# Patient Record
Sex: FEMALE | Race: WHITE | NOT HISPANIC OR LATINO
[De-identification: names, ages, dates, MRNs, and addresses within clinical notes are randomized per-mention and may not be internally consistent; named-entity substitution may affect disease eponyms.]

---

## 2017-01-04 PROBLEM — C57.00 FALLOPIAN TUBE CANCER, CARCINOMA (HCC): Status: ACTIVE | Noted: 2017-01-04

## 2017-01-04 PROBLEM — E66.01 MORBID OBESITY WITH BMI OF 40.0-44.9, ADULT (HCC): Status: ACTIVE | Noted: 2017-01-04

## 2017-03-15 PROBLEM — Z12.83 ENCOUNTER FOR SCREENING FOR MALIGNANT NEOPLASM OF SKIN: Status: ACTIVE | Noted: 2017-03-15

## 2017-04-24 PROBLEM — J30.2 SEASONAL ALLERGIC RHINITIS: Status: ACTIVE | Noted: 2017-04-24

## 2017-09-19 ENCOUNTER — RX ONLY (OUTPATIENT)
Age: 57
Setting detail: RX ONLY
End: 2017-09-19

## 2018-01-16 PROBLEM — I47.10 PAROXYSMAL SVT (SUPRAVENTRICULAR TACHYCARDIA) (HCC): Status: ACTIVE | Noted: 2018-01-16

## 2018-01-18 ENCOUNTER — OFFICE VISIT (OUTPATIENT)
Dept: CARDIOLOGY | Facility: MEDICAL CENTER | Age: 58
End: 2018-01-18
Payer: COMMERCIAL

## 2018-01-18 VITALS
BODY MASS INDEX: 42.89 KG/M2 | HEART RATE: 86 BPM | SYSTOLIC BLOOD PRESSURE: 98 MMHG | OXYGEN SATURATION: 94 % | HEIGHT: 68 IN | DIASTOLIC BLOOD PRESSURE: 68 MMHG | WEIGHT: 283 LBS

## 2018-01-18 DIAGNOSIS — R00.2 PALPITATIONS: ICD-10-CM

## 2018-01-18 DIAGNOSIS — I47.10 PAROXYSMAL SVT (SUPRAVENTRICULAR TACHYCARDIA): ICD-10-CM

## 2018-01-18 PROCEDURE — 99203 OFFICE O/P NEW LOW 30 MIN: CPT | Performed by: INTERNAL MEDICINE

## 2018-01-18 ASSESSMENT — ENCOUNTER SYMPTOMS
FEVER: 0
BRUISES/BLEEDS EASILY: 0
PND: 0
HEARTBURN: 0
DIZZINESS: 0
BLURRED VISION: 0
COUGH: 0
HEADACHES: 0
DEPRESSION: 0
SHORTNESS OF BREATH: 0
NAUSEA: 0
NERVOUS/ANXIOUS: 0
EYE DISCHARGE: 0
CHILLS: 0
MYALGIAS: 0
PALPITATIONS: 1

## 2018-01-18 NOTE — PROGRESS NOTES
Subjective:   Sydney Carlin is a 57 y.o. female who presents today in consultation at the request of Dr. Valencia for palpitations.  Chief Complaint: My heart races and I have chest pain  This patient has a possible causation of 34 months ago for SVT. It was recommended that she take Toprol-XL 25 mg per day. She is currently not taking the drug and she does not know when she stopped it.  In the last 6 weeks she has had 3-4 episodes at rest of 1-2 minutes of rapid heart action sometimes associated with left parasternal chest pain. She is worried about the implications thereof.  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 She is currently receiving cyclophosphamide, epocadastat and an immunotherapeutic vaccine under investigational protocol.   Overall she feels fairly well. Other problems as documented below including sleep apnea.  Office EKG today demonstrates voltage criteria for LVH, otherwise normal.    Past Medical History:   Diagnosis Date   • Allergy     grapes, surya   • Back pain    • Birth control    • Fatty liver    • H/O: pneumonia    • Hyperplastic colon polyp    • Lactose intolerance    • Obesity    • Osteoarthritis    • Seasonal allergic rhinitis 4/24/2017   • Sleep apnea 11/20/2012   • Thyroid nodule 8/15/2013     Past Surgical History:   Procedure Laterality Date   • ANKLE LIGAMENT RECONSTRUCTION  08/2001    right ankle surgery   • LUMPECTOMY  1995    benign   • LUMPECTOMY      benign   • TONSILLECTOMY  6 years old     Family History   Problem Relation Age of Onset   • Hypertension Mother    • Cancer Father      Lung/Brain   • Arthritis Father    • Diabetes Child      History   Smoking Status   • Never Smoker   Smokeless Tobacco   • Never Used     Allergies   Allergen Reactions   • Asa [Aspirin] Rash   • Ibuprofen Rash   • Sulfa Drugs Swelling     Outpatient Encounter Prescriptions as of 1/18/2018   Medication Sig Dispense Refill   • acetaminophen  (TYLENOL) 160 MG/5ML elixir Take 1,000 mg by mouth.     • pantoprazole (PROTONIX) 40 MG Pack 40 mg by Nasogastric route every day.     • NON SPECIFIED Epacadostat 100 mg, 3 tabs BID     • Insulin Glargine (TOUJEO SOLOSTAR) 300 UNIT/ML Solution Pen-injector Inject 10 Units as instructed every day. 5 PEN 3   • cyclophosphamide (CYTOXAN) 50 MG Tab Take 50 mg by mouth 2 times a day.     • oxycodone-acetaminophen (PERCOCET) 5-325 MG Tab Take 1-2 Tabs by mouth every four hours as needed.     • ONETOUCH DELICA LANCETS 33G Misc USE ONE LANCET TO TEST 6 TIMES A DAY AND AS NEEDED FOR SYMPTOMS OF HIGH OR LOW SUGAR 100 Each 3   • glucose blood (ONE TOUCH ULTRA TEST) strip Use six strips to test daily and prn for sx of high or low sugar, Dx: E11.65 DM,II w/ hyperglycemia,unspecified long term insulin use 100 Strip 3   • Insulin Pen Needle (B-D UF III MINI PEN NEEDLES) 31G X 5 MM Misc Use one daily as directed, Dx: E11.65 DM,II w/ hyperglycemia,unspecified long term insulin use 100 Each 3   • Cholecalciferol (VITAMIN D3) 2000 UNIT Cap Take  by mouth. 10,000 units every other day alternating with 6000 units every other day     • Blood Glucose Monitoring Suppl Device Meter: Dispense Device of Insurance Preference. Sig. Use as directed for blood sugar monitoring. Dx: E11.65 DM,II 1 Device 0   • metoprolol (LOPRESSOR) 25 MG Tab Take 1 Tab by mouth 2 times a day. 60 Tab 2     No facility-administered encounter medications on file as of 1/18/2018.      Review of Systems   Constitutional: Negative for chills, fever and malaise/fatigue.   Eyes: Negative for blurred vision and discharge.   Respiratory: Negative for cough and shortness of breath.    Cardiovascular: Positive for chest pain and palpitations. Negative for leg swelling and PND.   Gastrointestinal: Negative for heartburn and nausea.   Genitourinary: Negative for dysuria and urgency.   Musculoskeletal: Negative for myalgias.   Skin: Negative for itching and rash.   Neurological:  "Negative for dizziness and headaches.   Endo/Heme/Allergies: Negative for environmental allergies. Does not bruise/bleed easily.   Psychiatric/Behavioral: Negative for depression. The patient is not nervous/anxious.         Objective:   BP (!) 98/68   Pulse 86   Ht 1.727 m (5' 8\")   Wt (!) 128.4 kg (283 lb)   SpO2 94%   BMI 43.03 kg/m²     Physical Exam   Constitutional: She is oriented to person, place, and time. She appears well-developed and well-nourished.   HENT:   Head: Normocephalic and atraumatic.   Eyes: Conjunctivae and EOM are normal. No scleral icterus.   Neck: Neck supple. No JVD present. No thyromegaly present.   Cardiovascular: Normal rate, regular rhythm and normal heart sounds.  Exam reveals no gallop and no friction rub.    No murmur heard.  Pulmonary/Chest: Effort normal and breath sounds normal. No respiratory distress. She has no wheezes. She has no rales. She exhibits no tenderness.   Abdominal: Soft. Bowel sounds are normal. She exhibits no distension and no mass. There is no tenderness.   Neurological: She is alert and oriented to person, place, and time. Coordination normal.   Skin: Skin is warm and dry. No rash noted. No pallor.   Psychiatric: She has a normal mood and affect. Her behavior is normal. Judgment and thought content normal.       Assessment:     1. Paroxysmal SVT (supraventricular tachycardia) (CMS-[facility])  EKG   2. Palpitations         Medical Decision Making:  Today's Assessment / Status / Plan:   Statistically it is most likely she is having short episodes of PSVT.  I suggested getting a cell phone Winnie to record her short episodes of palpitations.  I also agree with resuming metoprolol SR 25 mg at bedtime  She'll check with her physicians at [place] regarding compatibility with their drugs.  I reassured her that her current chief complaint is not life-threatening.  If her oncologist needs further evaluation such as echocardiography or outpatient monitoring we'll be glad " to do that.  Thank you for this consultation  She will see Dr. Otilio Vazquez my partner regarding cardio oncology follow-up.

## 2018-01-18 NOTE — LETTER
[facility]   [address]   [address]  [place] [address]  Phone: [phone]  Fax: [phone]              Sydney Carlin  1960    Encounter Date: 1/18/2018    Marques Gallagher M.D.          PROGRESS NOTE:  Subjective:   Sydney Carlin is a 57 y.o. female who presents today in consultation at the request of Dr. Valencia for palpitations.  Chief Complaint: My heart races and I have chest pain  This patient has a possible causation of 34 months ago for SVT. It was recommended that she take Toprol-XL 25 mg per day. She is currently not taking the drug and she does not know when she stopped it.  In the last 6 weeks she has had 3-4 episodes at rest of 1-2 minutes of rapid heart action sometimes associated with left parasternal chest pain. She is worried about the implications thereof.  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 She is currently receiving cyclophosphamide, epocadastat and an immunotherapeutic vaccine under investigational protocol.   Overall she feels fairly well. Other problems as documented below including sleep apnea.  Office EKG today demonstrates voltage criteria for LVH, otherwise normal.    Past Medical History:   Diagnosis Date   • Allergy     grapes, surya   • Back pain    • Birth control    • Fatty liver    • H/O: pneumonia    • Hyperplastic colon polyp    • Lactose intolerance    • Obesity    • Osteoarthritis    • Seasonal allergic rhinitis 4/24/2017   • Sleep apnea 11/20/2012   • Thyroid nodule 8/15/2013     Past Surgical History:   Procedure Laterality Date   • ANKLE LIGAMENT RECONSTRUCTION  08/2001    right ankle surgery   • LUMPECTOMY  1995    benign   • LUMPECTOMY      benign   • TONSILLECTOMY  6 years old     Family History   Problem Relation Age of Onset   • Hypertension Mother    • Cancer Father      Lung/Brain   • Arthritis Father    • Diabetes Child       History   Smoking Status   • Never Smoker   Smokeless Tobacco   • Never Used     Allergies   Allergen Reactions   • Asa [Aspirin] Rash   • Ibuprofen Rash   • Sulfa Drugs Swelling     Outpatient Encounter Prescriptions as of 1/18/2018   Medication Sig Dispense Refill   • acetaminophen (TYLENOL) 160 MG/5ML elixir Take 1,000 mg by mouth.     • pantoprazole (PROTONIX) 40 MG Pack 40 mg by Nasogastric route every day.     • NON SPECIFIED Epacadostat 100 mg, 3 tabs BID     • Insulin Glargine (TOUJEO SOLOSTAR) 300 UNIT/ML Solution Pen-injector Inject 10 Units as instructed every day. 5 PEN 3   • cyclophosphamide (CYTOXAN) 50 MG Tab Take 50 mg by mouth 2 times a day.     • oxycodone-acetaminophen (PERCOCET) 5-325 MG Tab Take 1-2 Tabs by mouth every four hours as needed.     • ONETOUCH DELICA LANCETS 33G Misc USE ONE LANCET TO TEST 6 TIMES A DAY AND AS NEEDED FOR SYMPTOMS OF HIGH OR LOW SUGAR 100 Each 3   • glucose blood (ONE TOUCH ULTRA TEST) strip Use six strips to test daily and prn for sx of high or low sugar, Dx: E11.65 DM,II w/ hyperglycemia,unspecified long term insulin use 100 Strip 3   • Insulin Pen Needle (B-D UF III MINI PEN NEEDLES) 31G X 5 MM Misc Use one daily as directed, Dx: E11.65 DM,II w/ hyperglycemia,unspecified long term insulin use 100 Each 3   • Cholecalciferol (VITAMIN D3) 2000 UNIT Cap Take  by mouth. 10,000 units every other day alternating with 6000 units every other day     • Blood Glucose Monitoring Suppl Device Meter: Dispense Device of Insurance Preference. Sig. Use as directed for blood sugar monitoring. Dx: E11.65 DM,II 1 Device 0   • metoprolol (LOPRESSOR) 25 MG Tab Take 1 Tab by mouth 2 times a day. 60 Tab 2     No facility-administered encounter medications on file as of 1/18/2018.      Review of Systems   Constitutional: Negative for chills, fever and malaise/fatigue.   Eyes: Negative for blurred vision and discharge.   Respiratory: Negative for cough and shortness of breath.     "  Cardiovascular: Positive for chest pain and palpitations. Negative for leg swelling and PND.   Gastrointestinal: Negative for heartburn and nausea.   Genitourinary: Negative for dysuria and urgency.   Musculoskeletal: Negative for myalgias.   Skin: Negative for itching and rash.   Neurological: Negative for dizziness and headaches.   Endo/Heme/Allergies: Negative for environmental allergies. Does not bruise/bleed easily.   Psychiatric/Behavioral: Negative for depression. The patient is not nervous/anxious.         Objective:   BP (!) 98/68   Pulse 86   Ht 1.727 m (5' 8\")   Wt (!) 128.4 kg (283 lb)   SpO2 94%   BMI 43.03 kg/m²      Physical Exam   Constitutional: She is oriented to person, place, and time. She appears well-developed and well-nourished.   HENT:   Head: Normocephalic and atraumatic.   Eyes: Conjunctivae and EOM are normal. No scleral icterus.   Neck: Neck supple. No JVD present. No thyromegaly present.   Cardiovascular: Normal rate, regular rhythm and normal heart sounds.  Exam reveals no gallop and no friction rub.    No murmur heard.  Pulmonary/Chest: Effort normal and breath sounds normal. No respiratory distress. She has no wheezes. She has no rales. She exhibits no tenderness.   Abdominal: Soft. Bowel sounds are normal. She exhibits no distension and no mass. There is no tenderness.   Neurological: She is alert and oriented to person, place, and time. Coordination normal.   Skin: Skin is warm and dry. No rash noted. No pallor.   Psychiatric: She has a normal mood and affect. Her behavior is normal. Judgment and thought content normal.       Assessment:     1. Paroxysmal SVT (supraventricular tachycardia) (CMS-[facility])  EKG   2. Palpitations         Medical Decision Making:  Today's Assessment / Status / Plan:   Statistically it is most likely she is having short episodes of PSVT.  I suggested getting a cell phone Winnie to record her short episodes of palpitations.  I also agree with resuming " metoprolol SR 25 mg at bedtime  She'll check with her physicians at [place] regarding compatibility with their drugs.  I reassured her that her current chief complaint is not life-threatening.  If her oncologist needs further evaluation such as echocardiography or outpatient monitoring we'll be glad to do that.  Thank you for this consultation  She will see Dr. Otilio Vazquez my partner regarding cardio oncology follow-up.      KARISSA LopesO.  [address]  [place] [address]  VIA In Basket

## 2018-02-26 ENCOUNTER — OFFICE VISIT (OUTPATIENT)
Dept: CARDIOLOGY | Facility: MEDICAL CENTER | Age: 58
End: 2018-02-26
Payer: COMMERCIAL

## 2018-02-26 VITALS
WEIGHT: 286 LBS | HEIGHT: 68 IN | OXYGEN SATURATION: 94 % | BODY MASS INDEX: 43.35 KG/M2 | DIASTOLIC BLOOD PRESSURE: 80 MMHG | SYSTOLIC BLOOD PRESSURE: 130 MMHG | HEART RATE: 92 BPM

## 2018-02-26 DIAGNOSIS — C57.00 CARCINOMA OF FALLOPIAN TUBE, UNSPECIFIED LATERALITY (HCC): ICD-10-CM

## 2018-02-26 DIAGNOSIS — G47.33 OSA (OBSTRUCTIVE SLEEP APNEA): ICD-10-CM

## 2018-02-26 DIAGNOSIS — I47.10 PAROXYSMAL SVT (SUPRAVENTRICULAR TACHYCARDIA): ICD-10-CM

## 2018-02-26 PROCEDURE — 99214 OFFICE O/P EST MOD 30 MIN: CPT | Performed by: INTERNAL MEDICINE

## 2018-02-26 RX ORDER — OXYCODONE HYDROCHLORIDE 5 MG/1
5-30 CAPSULE ORAL PRN
COMMUNITY
End: 2019-09-23

## 2018-02-26 ASSESSMENT — ENCOUNTER SYMPTOMS
PND: 0
FALLS: 0
DIZZINESS: 0
SHORTNESS OF BREATH: 0
CHILLS: 0
PALPITATIONS: 0
NAUSEA: 0
FEVER: 0
WEAKNESS: 0
SORE THROAT: 0
BRUISES/BLEEDS EASILY: 0
CLAUDICATION: 0
COUGH: 0
FOCAL WEAKNESS: 0
ABDOMINAL PAIN: 0
BLURRED VISION: 0

## 2018-02-26 NOTE — PROGRESS NOTES
Subjective:   Sydney Carlin is a 57 y.o. female who presents today for follow-up of her history of fallopian tube cancer on experimental immunotherapy with prior history of paroxysmal SVT    She's been doing well no recurrent palpitations    She had the flu so she was not able to do her CPAP therapy but otherwise reports good compliance    She continues with regular follow-up at [place]    Past Medical History:   Diagnosis Date   • Allergy     grapes, ginger   • Back pain    • Birth control    • Cancer (CMS-HCC)     fallopian tube   • Fatty liver    • H/O: pneumonia    • Hyperplastic colon polyp    • Lactose intolerance    • Obesity    • Osteoarthritis    • Seasonal allergic rhinitis 4/24/2017   • Sleep apnea 11/20/2012   • Thyroid nodule 8/15/2013     Past Surgical History:   Procedure Laterality Date   • ANKLE LIGAMENT RECONSTRUCTION  08/2001    right ankle surgery   • LUMPECTOMY  1995    benign   • LUMPECTOMY      benign   • TONSILLECTOMY  6 years old     Family History   Problem Relation Age of Onset   • Hypertension Mother    • Cancer Father      Lung/Brain   • Arthritis Father    • Diabetes Child      History   Smoking Status   • Never Smoker   Smokeless Tobacco   • Never Used     Allergies   Allergen Reactions   • Asa [Aspirin] Rash   • Ibuprofen Rash   • Sulfa Drugs Swelling   • Tamiflu      Rash     Outpatient Encounter Prescriptions as of 2/26/2018   Medication Sig Dispense Refill   • Acetaminophen (TYLENOL PO) Take 500 mg by mouth. 1000 mg PRN     • oxycodone 5 MG capsule Take 5-30 mg by mouth as needed for Severe Pain.     • Simethicone (GAS FREE EXTRA STRENGTH PO) Take  by mouth.     • pantoprazole (PROTONIX) 40 MG Pack 40 mg by Nasogastric route every day.     • NON SPECIFIED Epacadostat 100 mg, 3 tabs BID     • Insulin Glargine (TOUJEO SOLOSTAR) 300 UNIT/ML Solution Pen-injector Inject 10 Units as instructed every day. (Patient taking differently: Inject  as instructed every day. Sliding scale) 5  "PEN 3   • cyclophosphamide (CYTOXAN) 50 MG Tab Take 50 mg by mouth 2 times a day. 7 days on and 7 days off     • Cholecalciferol (VITAMIN D3) 2000 UNIT Cap Take  by mouth. 10,000 units every other day alternating with 5000 units every other day     • benzonatate (TESSALON PERLES) 100 MG Cap Take 100 mg by mouth 3 times a day as needed for Cough.     • acetaminophen (TYLENOL) 160 MG/5ML elixir Take 1,000 mg by mouth.     • metoprolol (LOPRESSOR) 25 MG Tab Take 1 Tab by mouth 2 times a day. 60 Tab 2   • ONETOUCH DELICA LANCETS 33G Misc USE ONE LANCET TO TEST 6 TIMES A DAY AND AS NEEDED FOR SYMPTOMS OF HIGH OR LOW SUGAR 100 Each 3   • glucose blood (ONE TOUCH ULTRA TEST) strip Use six strips to test daily and prn for sx of high or low sugar, Dx: E11.65 DM,II w/ hyperglycemia,unspecified long term insulin use 100 Strip 3   • Insulin Pen Needle (B-D UF III MINI PEN NEEDLES) 31G X 5 MM Misc Use one daily as directed, Dx: E11.65 DM,II w/ hyperglycemia,unspecified long term insulin use 100 Each 3   • Blood Glucose Monitoring Suppl Device Meter: Dispense Device of Insurance Preference. Sig. Use as directed for blood sugar monitoring. Dx: E11.65 DM,II 1 Device 0     No facility-administered encounter medications on file as of 2/26/2018.      Review of Systems   Constitutional: Negative for chills and fever.   HENT: Negative for sore throat.    Eyes: Negative for blurred vision.   Respiratory: Negative for cough and shortness of breath.    Cardiovascular: Negative for chest pain, palpitations, claudication, leg swelling and PND.   Gastrointestinal: Negative for abdominal pain and nausea.   Musculoskeletal: Negative for falls and joint pain.   Skin: Negative for rash.   Neurological: Negative for dizziness, focal weakness and weakness.   Endo/Heme/Allergies: Does not bruise/bleed easily.        Objective:   /80   Pulse 92   Ht 1.727 m (5' 8\")   Wt (!) 129.7 kg (286 lb)   SpO2 94%   BMI 43.49 kg/m²     Physical Exam "   Constitutional: No distress.   HENT:   Mouth/Throat: Oropharynx is clear and moist.   Eyes: No scleral icterus.   Cardiovascular: Normal rate, regular rhythm and intact distal pulses.  Exam reveals no gallop and no friction rub.    No murmur heard.  Pulmonary/Chest: Effort normal and breath sounds normal. She has no rales.   Abdominal: Bowel sounds are normal. There is no tenderness.   Musculoskeletal: She exhibits no edema.   Neurological: She is alert.   Skin: No rash noted. She is not diaphoretic.   Psychiatric: She has a normal mood and affect.     We reviewed her labs from [place]    Assessment:     1. Paroxysmal SVT (supraventricular tachycardia) (CMS-[facility])     2. NIKUNJ (obstructive sleep apnea)     3. Carcinoma of fallopian tube, unspecified laterality (CMS-HCC)         Medical Decision Making:  Today's Assessment / Status / Plan:     It was my pleasure to meet with Ms. Carlin.    She is accompanied by her     For paroxysmal SVT given the radical concern of beta blockers effect on the immune system if we be best not to take these. She has any recurrent paroxysms so she would call us for any paroxysms that are short-lived and we can get a Holter monitor or event monitor. May be better to take low-dose diltiazem if that were the case although her blood pressure may make it difficult to tolerate calcium channel blockers or beta blockers    I encouraged her to continue on CPAP    I will see Ms. Carlin back in 1 year time and encouraged her to follow up with us over the phone or e-mail using my MyChart as issues arise.    It is my pleasure to participate in the care of Ms. Carlin.  Thank you for referring her to the Cardio-Oncology Program at [facility]. Please do not hesitate to contact me with questions or concerns.    Samuel Vazquez MD PhD FACC  Cardiologist [facility]

## 2018-03-19 ENCOUNTER — APPOINTMENT (RX ONLY)
Dept: URBAN - METROPOLITAN AREA CLINIC 31 | Facility: CLINIC | Age: 58
Setting detail: DERMATOLOGY
End: 2018-03-19

## 2018-03-19 DIAGNOSIS — D18.0 HEMANGIOMA: ICD-10-CM

## 2018-03-19 DIAGNOSIS — L70.8 OTHER ACNE: ICD-10-CM

## 2018-03-19 DIAGNOSIS — L57.8 OTHER SKIN CHANGES DUE TO CHRONIC EXPOSURE TO NONIONIZING RADIATION: ICD-10-CM

## 2018-03-19 DIAGNOSIS — D22 MELANOCYTIC NEVI: ICD-10-CM

## 2018-03-19 DIAGNOSIS — L81.4 OTHER MELANIN HYPERPIGMENTATION: ICD-10-CM

## 2018-03-19 DIAGNOSIS — L82.1 OTHER SEBORRHEIC KERATOSIS: ICD-10-CM

## 2018-03-19 PROBLEM — E13.9 OTHER SPECIFIED DIABETES MELLITUS WITHOUT COMPLICATIONS: Status: ACTIVE | Noted: 2018-03-19

## 2018-03-19 PROBLEM — L57.0 ACTINIC KERATOSIS: Status: ACTIVE | Noted: 2018-03-19

## 2018-03-19 PROBLEM — D22.5 MELANOCYTIC NEVI OF TRUNK: Status: ACTIVE | Noted: 2018-03-19

## 2018-03-19 PROBLEM — D18.01 HEMANGIOMA OF SKIN AND SUBCUTANEOUS TISSUE: Status: ACTIVE | Noted: 2018-03-19

## 2018-03-19 PROCEDURE — ? ADDITIONAL NOTES

## 2018-03-19 PROCEDURE — ? COUNSELING

## 2018-03-19 PROCEDURE — 99213 OFFICE O/P EST LOW 20 MIN: CPT

## 2018-03-19 ASSESSMENT — LOCATION SIMPLE DESCRIPTION DERM
LOCATION SIMPLE: RIGHT LOWER BACK
LOCATION SIMPLE: RIGHT CHEEK
LOCATION SIMPLE: UPPER BACK
LOCATION SIMPLE: RIGHT FOREARM
LOCATION SIMPLE: LEFT NOSE
LOCATION SIMPLE: LEFT FOREARM
LOCATION SIMPLE: LEFT CHEEK
LOCATION SIMPLE: LEFT LOWER BACK
LOCATION SIMPLE: CHEST

## 2018-03-19 ASSESSMENT — LOCATION DETAILED DESCRIPTION DERM
LOCATION DETAILED: LEFT DISTAL DORSAL FOREARM
LOCATION DETAILED: INFERIOR THORACIC SPINE
LOCATION DETAILED: LEFT INFERIOR MEDIAL MIDBACK
LOCATION DETAILED: STERNAL NOTCH
LOCATION DETAILED: UPPER STERNUM
LOCATION DETAILED: RIGHT PROXIMAL DORSAL FOREARM
LOCATION DETAILED: LEFT PROXIMAL DORSAL FOREARM
LOCATION DETAILED: RIGHT SUPERIOR MEDIAL MIDBACK
LOCATION DETAILED: RIGHT DISTAL DORSAL FOREARM
LOCATION DETAILED: LEFT NARIS
LOCATION DETAILED: RIGHT INFERIOR CENTRAL MALAR CHEEK
LOCATION DETAILED: LEFT INFERIOR CENTRAL MALAR CHEEK

## 2018-03-19 ASSESSMENT — LOCATION ZONE DERM
LOCATION ZONE: ARM
LOCATION ZONE: NOSE
LOCATION ZONE: TRUNK
LOCATION ZONE: FACE

## 2018-03-19 NOTE — PROCEDURE: ADDITIONAL NOTES
Additional Notes: Includes spots of concern mentioned on intake on cheeks
Additional Notes: Vs irritation from CPAP. Actually hard to visualize a specific lesion,

## 2018-03-19 NOTE — HPI: FULL BODY SKIN EXAMINATION
How Severe Are Your Spot(S)?: mild
What Is The Reason For Today's Visit?: Full Body Skin Examination
What Is The Reason For Today's Visit? (Being Monitored For X): concerning skin lesions on an annual basis
Additional History: Patient is on a Clinical Trial Treatment at [place] for treatment of her Fallopian Tube Cancer. Patient is responding well and cancer is getting smaller.

## 2018-09-17 ENCOUNTER — APPOINTMENT (RX ONLY)
Dept: URBAN - METROPOLITAN AREA CLINIC 31 | Facility: CLINIC | Age: 58
Setting detail: DERMATOLOGY
End: 2018-09-17

## 2018-09-27 ENCOUNTER — APPOINTMENT (RX ONLY)
Dept: URBAN - METROPOLITAN AREA CLINIC 31 | Facility: CLINIC | Age: 58
Setting detail: DERMATOLOGY
End: 2018-09-27

## 2018-09-27 DIAGNOSIS — L82.1 OTHER SEBORRHEIC KERATOSIS: ICD-10-CM

## 2018-09-27 DIAGNOSIS — Z71.89 OTHER SPECIFIED COUNSELING: ICD-10-CM

## 2018-09-27 DIAGNOSIS — L57.3 POIKILODERMA OF CIVATTE: ICD-10-CM

## 2018-09-27 DIAGNOSIS — D18.0 HEMANGIOMA: ICD-10-CM

## 2018-09-27 DIAGNOSIS — D22 MELANOCYTIC NEVI: ICD-10-CM

## 2018-09-27 DIAGNOSIS — L81.4 OTHER MELANIN HYPERPIGMENTATION: ICD-10-CM

## 2018-09-27 DIAGNOSIS — L57.8 OTHER SKIN CHANGES DUE TO CHRONIC EXPOSURE TO NONIONIZING RADIATION: ICD-10-CM

## 2018-09-27 PROBLEM — D22.5 MELANOCYTIC NEVI OF TRUNK: Status: ACTIVE | Noted: 2018-09-27

## 2018-09-27 PROBLEM — D18.01 HEMANGIOMA OF SKIN AND SUBCUTANEOUS TISSUE: Status: ACTIVE | Noted: 2018-09-27

## 2018-09-27 PROCEDURE — 99396 PREV VISIT EST AGE 40-64: CPT

## 2018-09-27 PROCEDURE — ? COUNSELING

## 2018-09-27 ASSESSMENT — LOCATION SIMPLE DESCRIPTION DERM
LOCATION SIMPLE: RIGHT CHEEK
LOCATION SIMPLE: LEFT LOWER BACK
LOCATION SIMPLE: LEFT FOREARM
LOCATION SIMPLE: LEFT ANTERIOR NECK
LOCATION SIMPLE: UPPER BACK
LOCATION SIMPLE: RIGHT ANTERIOR NECK
LOCATION SIMPLE: RIGHT LOWER BACK
LOCATION SIMPLE: LEFT CHEEK
LOCATION SIMPLE: CHEST
LOCATION SIMPLE: RIGHT FOREARM

## 2018-09-27 ASSESSMENT — LOCATION DETAILED DESCRIPTION DERM
LOCATION DETAILED: INFERIOR THORACIC SPINE
LOCATION DETAILED: UPPER STERNUM
LOCATION DETAILED: RIGHT PROXIMAL DORSAL FOREARM
LOCATION DETAILED: LEFT PROXIMAL DORSAL FOREARM
LOCATION DETAILED: RIGHT DISTAL DORSAL FOREARM
LOCATION DETAILED: RIGHT SUPERIOR MEDIAL MIDBACK
LOCATION DETAILED: RIGHT INFERIOR CENTRAL MALAR CHEEK
LOCATION DETAILED: RIGHT INFERIOR ANTERIOR NECK
LOCATION DETAILED: LEFT INFERIOR CENTRAL MALAR CHEEK
LOCATION DETAILED: STERNAL NOTCH
LOCATION DETAILED: LEFT DISTAL DORSAL FOREARM
LOCATION DETAILED: LEFT INFERIOR MEDIAL MIDBACK
LOCATION DETAILED: LEFT INFERIOR ANTERIOR NECK

## 2018-09-27 ASSESSMENT — LOCATION ZONE DERM
LOCATION ZONE: ARM
LOCATION ZONE: FACE
LOCATION ZONE: TRUNK
LOCATION ZONE: NECK

## 2018-09-27 NOTE — HPI: FULL BODY SKIN EXAMINATION
How Severe Are Your Spot(S)?: mild
What Is The Reason For Today's Visit?: Full Body Skin Examination
What Is The Reason For Today's Visit? (Being Monitored For X): concerning skin lesions on an annual basis
Additional History: \\nPatient is on Clinical Trial Medication for her Fallopian tube cancer.  Patient has been getting swelling in her Left leg x 3 weeks and her PCP has referred patient to a Specialist at [place].

## 2019-03-26 ENCOUNTER — APPOINTMENT (RX ONLY)
Dept: URBAN - METROPOLITAN AREA CLINIC 4 | Facility: CLINIC | Age: 59
Setting detail: DERMATOLOGY
End: 2019-03-26

## 2019-03-26 DIAGNOSIS — D22 MELANOCYTIC NEVI: ICD-10-CM

## 2019-03-26 DIAGNOSIS — L57.8 OTHER SKIN CHANGES DUE TO CHRONIC EXPOSURE TO NONIONIZING RADIATION: ICD-10-CM

## 2019-03-26 DIAGNOSIS — L57.0 ACTINIC KERATOSIS: ICD-10-CM

## 2019-03-26 DIAGNOSIS — L82.1 OTHER SEBORRHEIC KERATOSIS: ICD-10-CM

## 2019-03-26 DIAGNOSIS — Z71.89 OTHER SPECIFIED COUNSELING: ICD-10-CM

## 2019-03-26 DIAGNOSIS — L81.4 OTHER MELANIN HYPERPIGMENTATION: ICD-10-CM

## 2019-03-26 DIAGNOSIS — D18.0 HEMANGIOMA: ICD-10-CM

## 2019-03-26 PROBLEM — D22.5 MELANOCYTIC NEVI OF TRUNK: Status: ACTIVE | Noted: 2019-03-26

## 2019-03-26 PROBLEM — D18.01 HEMANGIOMA OF SKIN AND SUBCUTANEOUS TISSUE: Status: ACTIVE | Noted: 2019-03-26

## 2019-03-26 PROBLEM — D48.5 NEOPLASM OF UNCERTAIN BEHAVIOR OF SKIN: Status: ACTIVE | Noted: 2019-03-26

## 2019-03-26 PROCEDURE — 17000 DESTRUCT PREMALG LESION: CPT | Mod: 59

## 2019-03-26 PROCEDURE — ? ADDITIONAL NOTES

## 2019-03-26 PROCEDURE — ? LIQUID NITROGEN

## 2019-03-26 PROCEDURE — 99214 OFFICE O/P EST MOD 30 MIN: CPT | Mod: 25

## 2019-03-26 PROCEDURE — ? COUNSELING

## 2019-03-26 PROCEDURE — ? BIOPSY BY SHAVE METHOD

## 2019-03-26 PROCEDURE — 11102 TANGNTL BX SKIN SINGLE LES: CPT

## 2019-03-26 ASSESSMENT — LOCATION DETAILED DESCRIPTION DERM
LOCATION DETAILED: INFERIOR THORACIC SPINE
LOCATION DETAILED: UPPER STERNUM
LOCATION DETAILED: RIGHT DISTAL DORSAL FOREARM
LOCATION DETAILED: RIGHT SUPERIOR MEDIAL MIDBACK
LOCATION DETAILED: STERNAL NOTCH
LOCATION DETAILED: LEFT DISTAL DORSAL FOREARM
LOCATION DETAILED: LEFT PROXIMAL DORSAL FOREARM
LOCATION DETAILED: RIGHT INFERIOR CENTRAL MALAR CHEEK
LOCATION DETAILED: LEFT INFERIOR MEDIAL MIDBACK
LOCATION DETAILED: RIGHT PROXIMAL DORSAL FOREARM
LOCATION DETAILED: LEFT INFERIOR CENTRAL MALAR CHEEK

## 2019-03-26 ASSESSMENT — LOCATION SIMPLE DESCRIPTION DERM
LOCATION SIMPLE: LEFT FOREARM
LOCATION SIMPLE: RIGHT FOREARM
LOCATION SIMPLE: CHEST
LOCATION SIMPLE: UPPER BACK
LOCATION SIMPLE: RIGHT LOWER BACK
LOCATION SIMPLE: LEFT LOWER BACK
LOCATION SIMPLE: LEFT CHEEK
LOCATION SIMPLE: RIGHT CHEEK

## 2019-03-26 ASSESSMENT — LOCATION ZONE DERM
LOCATION ZONE: TRUNK
LOCATION ZONE: ARM
LOCATION ZONE: FACE

## 2019-03-26 NOTE — PROCEDURE: BIOPSY BY SHAVE METHOD
Depth Of Biopsy: dermis
Destruction After The Procedure: No
Consent: Written consent was obtained and risks were reviewed including but not limited to scarring, infection, bleeding, scabbing, incomplete removal, nerve damage and allergy to anesthesia.
Size Of Lesion In Cm: 0.4
Curettage Text: The wound bed was treated with curettage after the biopsy was performed.
Lab: [id number]
Biopsy Type: H and E
Electrodesiccation Text: The wound bed was treated with electrodesiccation after the biopsy was performed.
Lab Facility: 
Wound Care: Aquaphor
Additional Anesthesia Volume In Cc (Will Not Render If 0): 0
Anesthesia Type: 1% lidocaine with epinephrine
Cryotherapy Text: The wound bed was treated with cryotherapy after the biopsy was performed.
Electrodesiccation And Curettage Text: The wound bed was treated with electrodesiccation and curettage after the biopsy was performed.
Was A Bandage Applied: Yes
Dressing: Band-Aid
Type Of Destruction Used: Curettage
Billing Type: Third-Party Bill
Notification Instructions: Patient will be notified of biopsy results. However, patient instructed to call the office if not contacted within 2 weeks.
Silver Nitrate Text: The wound bed was treated with silver nitrate after the biopsy was performed.
Hemostasis: Aluminum Chloride and Electrocautery
Detail Level: Detailed
Biopsy Method: 15 blade
Post-Care Instructions: I reviewed with the patient in detail post-care instructions. Patient is to keep the biopsy site dry overnight, and then apply bacitracin twice daily until healed. Patient may apply hydrogen peroxide soaks to remove any crusting.

## 2019-03-26 NOTE — PROCEDURE: LIQUID NITROGEN
Consent: The patient's consent was obtained including but not limited to risks of crusting, scabbing, blistering, scarring, darker or lighter pigmentary change, recurrence, incomplete removal and infection.
Render Post-Care Instructions In Note?: no
Post-Care Instructions: I reviewed with the patient in detail post-care instructions. Patient is to wear sunprotection, and avoid picking at any of the treated lesions. Pt may apply Vaseline  or Aquaphor to crusted or scabbing areas.
Detail Level: Detailed
Duration Of Freeze Thaw-Cycle (Seconds): 0

## 2019-10-01 ENCOUNTER — APPOINTMENT (RX ONLY)
Dept: URBAN - METROPOLITAN AREA CLINIC 4 | Facility: CLINIC | Age: 59
Setting detail: DERMATOLOGY
End: 2019-10-01

## 2019-10-01 DIAGNOSIS — L57.8 OTHER SKIN CHANGES DUE TO CHRONIC EXPOSURE TO NONIONIZING RADIATION: ICD-10-CM

## 2019-10-01 DIAGNOSIS — L81.4 OTHER MELANIN HYPERPIGMENTATION: ICD-10-CM

## 2019-10-01 DIAGNOSIS — D18.0 HEMANGIOMA: ICD-10-CM

## 2019-10-01 DIAGNOSIS — Z71.89 OTHER SPECIFIED COUNSELING: ICD-10-CM

## 2019-10-01 DIAGNOSIS — D22 MELANOCYTIC NEVI: ICD-10-CM

## 2019-10-01 DIAGNOSIS — L82.1 OTHER SEBORRHEIC KERATOSIS: ICD-10-CM

## 2019-10-01 DIAGNOSIS — L90.5 SCAR CONDITIONS AND FIBROSIS OF SKIN: ICD-10-CM

## 2019-10-01 PROBLEM — D18.01 HEMANGIOMA OF SKIN AND SUBCUTANEOUS TISSUE: Status: ACTIVE | Noted: 2019-10-01

## 2019-10-01 PROBLEM — D22.5 MELANOCYTIC NEVI OF TRUNK: Status: ACTIVE | Noted: 2019-10-01

## 2019-10-01 PROCEDURE — 99214 OFFICE O/P EST MOD 30 MIN: CPT

## 2019-10-01 PROCEDURE — ? COUNSELING

## 2019-10-01 PROCEDURE — ? ADDITIONAL NOTES

## 2019-10-01 ASSESSMENT — LOCATION SIMPLE DESCRIPTION DERM
LOCATION SIMPLE: CHEST
LOCATION SIMPLE: ABDOMEN
LOCATION SIMPLE: UPPER BACK
LOCATION SIMPLE: RIGHT FOREARM
LOCATION SIMPLE: RIGHT LOWER BACK
LOCATION SIMPLE: LEFT LOWER BACK
LOCATION SIMPLE: LEFT FOREARM
LOCATION SIMPLE: LEFT CHEEK
LOCATION SIMPLE: RIGHT CHEEK

## 2019-10-01 ASSESSMENT — LOCATION DETAILED DESCRIPTION DERM
LOCATION DETAILED: RIGHT SUPERIOR MEDIAL MIDBACK
LOCATION DETAILED: STERNAL NOTCH
LOCATION DETAILED: LEFT DISTAL DORSAL FOREARM
LOCATION DETAILED: PERIUMBILICAL SKIN
LOCATION DETAILED: RIGHT INFERIOR CENTRAL MALAR CHEEK
LOCATION DETAILED: RIGHT PROXIMAL DORSAL FOREARM
LOCATION DETAILED: LEFT INFERIOR CENTRAL MALAR CHEEK
LOCATION DETAILED: RIGHT DISTAL DORSAL FOREARM
LOCATION DETAILED: LEFT INFERIOR MEDIAL MIDBACK
LOCATION DETAILED: UPPER STERNUM
LOCATION DETAILED: INFERIOR THORACIC SPINE
LOCATION DETAILED: LEFT PROXIMAL DORSAL FOREARM

## 2019-10-01 ASSESSMENT — LOCATION ZONE DERM
LOCATION ZONE: TRUNK
LOCATION ZONE: FACE
LOCATION ZONE: ARM
LOCATION ZONE: TRUNK

## 2019-12-11 PROBLEM — Z00.6 CLINICAL TRIAL EXAM: Status: ACTIVE | Noted: 2017-08-14

## 2019-12-11 PROBLEM — K43.9 VENTRAL HERNIA: Status: ACTIVE | Noted: 2019-01-23

## 2019-12-11 PROBLEM — R33.9 URINARY RETENTION: Status: ACTIVE | Noted: 2018-03-21

## 2019-12-11 PROBLEM — Z98.890 PONV (POSTOPERATIVE NAUSEA AND VOMITING): Status: ACTIVE | Noted: 2019-12-11

## 2019-12-11 PROBLEM — R11.2 PONV (POSTOPERATIVE NAUSEA AND VOMITING): Status: ACTIVE | Noted: 2019-12-11

## 2019-12-11 PROBLEM — S32.009A FRACTURE OF LUMBAR SPINE (HCC): Status: ACTIVE | Noted: 2019-10-31

## 2020-02-10 ENCOUNTER — TELEPHONE (OUTPATIENT)
Dept: CARDIOLOGY | Facility: MEDICAL CENTER | Age: 60
End: 2020-02-10

## 2020-02-10 NOTE — TELEPHONE ENCOUNTER
Per  note patient called and said they were seen recently at [facility]. I called medical records dept [phone] to have records sent to our fax [phone]. Will have in STAT scan for patients appointment tomorrow.

## 2020-02-10 NOTE — TELEPHONE ENCOUNTER
CW    PT was recently seen at the [facility] on 1/26/2020. They are requesting that you contact the provider to get the medical records for their visit they have tomorrow 2/11/2020, they do not have the number or fax #.    Thank you,  Cony BOONE

## 2020-02-11 ENCOUNTER — OFFICE VISIT (OUTPATIENT)
Dept: CARDIOLOGY | Facility: MEDICAL CENTER | Age: 60
End: 2020-02-11
Payer: COMMERCIAL

## 2020-02-11 VITALS
WEIGHT: 287.8 LBS | SYSTOLIC BLOOD PRESSURE: 136 MMHG | HEIGHT: 67 IN | BODY MASS INDEX: 45.17 KG/M2 | OXYGEN SATURATION: 96 % | HEART RATE: 80 BPM | DIASTOLIC BLOOD PRESSURE: 78 MMHG

## 2020-02-11 DIAGNOSIS — R00.1 BRADYCARDIA: ICD-10-CM

## 2020-02-11 DIAGNOSIS — I87.2 VENOUS INSUFFICIENCY: Chronic | ICD-10-CM

## 2020-02-11 DIAGNOSIS — I47.10 SVT (SUPRAVENTRICULAR TACHYCARDIA) (HCC): ICD-10-CM

## 2020-02-11 DIAGNOSIS — I89.0 LYMPHEDEMA OF BOTH LOWER EXTREMITIES: ICD-10-CM

## 2020-02-11 PROCEDURE — 99213 OFFICE O/P EST LOW 20 MIN: CPT | Performed by: INTERNAL MEDICINE

## 2020-02-11 RX ORDER — COLCHICINE 0.6 MG/1
0.6 TABLET ORAL
COMMUNITY
Start: 2020-01-28 | End: 2020-07-16

## 2020-02-11 ASSESSMENT — ENCOUNTER SYMPTOMS
BRUISES/BLEEDS EASILY: 0
WEAKNESS: 0
FEVER: 0
CLAUDICATION: 0
PND: 0
ABDOMINAL PAIN: 0
FOCAL WEAKNESS: 0
CHILLS: 0
PALPITATIONS: 0
COUGH: 0
FALLS: 0
BLURRED VISION: 0
DIZZINESS: 0
SHORTNESS OF BREATH: 0
NAUSEA: 0
SORE THROAT: 0

## 2020-02-11 NOTE — PROGRESS NOTES
Chief Complaint   Patient presents with   • Follow-Up     Paroxysmal SVT       Subjective:   Sydney Carlin is a 59 y.o. 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    She was evaluated by cardiology at [facility] in the meantime for leg swelling was unclear if was attributed to the chemotherapies or immunotherapy or unrelated in that setting they did a venous ultrasound which confirms venous reflux of the superficial veins worse on the left greater than the right and her symptoms tend to be more left she is also history of lymph node resections    She had an echocardiogram at [facility] which is normal    She continues to have occasional palpitations that she had worrisome for symptomatic bradycardia with low pulse checks and feeling fatigued    Past Medical History:   Diagnosis Date   • Allergy     grapes, surya   • Back pain    • Birth control    • Cancer (HCC)     fallopian tube   • Fatty liver    • H/O: pneumonia    • Hyperplastic colon polyp    • Lactose intolerance    • Obesity    • Osteoarthritis    • Seasonal allergic rhinitis 4/24/2017   • Sleep apnea 11/20/2012   • Thyroid nodule 8/15/2013   • Venous insufficiency 2/2019 in setting of LE edema left>right      Past Surgical History:   Procedure Laterality Date   • ANKLE LIGAMENT RECONSTRUCTION  08/2001    right ankle surgery   • LUMPECTOMY  1995    benign   • LUMPECTOMY      benign   • TONSILLECTOMY  6 years old     Family History   Problem Relation Age of Onset   • Hypertension Mother    • Cancer Father         Lung/Brain   • Arthritis Father    • Diabetes Child      Social History     Socioeconomic History   • Marital status:      Spouse name: Not on file   • Number of children: Not on file   • Years of education: Not on file   • Highest education level: Not on file   Occupational History   • Not on file   Social Needs   • Financial  resource strain: Not on file   • Food insecurity:     Worry: Not on file     Inability: Not on file   • Transportation needs:     Medical: Not on file     Non-medical: Not on file   Tobacco Use   • Smoking status: Never Smoker   • Smokeless tobacco: Never Used   Substance and Sexual Activity   • Alcohol use: Yes     Alcohol/week: 0.5 oz     Types: 1 Glasses of wine per week     Frequency: Monthly or less     Comment: occasional   • Drug use: No   • Sexual activity: Yes     Partners: Male   Lifestyle   • Physical activity:     Days per week: Not on file     Minutes per session: Not on file   • Stress: Not on file   Relationships   • Social connections:     Talks on phone: Not on file     Gets together: Not on file     Attends [employer] service: Not on file     Active member of club or organization: Not on file     Attends meetings of clubs or organizations: Not on file     Relationship status: Not on file   • Intimate partner violence:     Fear of current or ex partner: Not on file     Emotionally abused: Not on file     Physically abused: Not on file     Forced sexual activity: Not on file   Other Topics Concern   • Not on file   Social History Narrative   • Not on file     Allergies   Allergen Reactions   • Asa [Aspirin] Rash   • Ibuprofen Rash     t   • Oseltamivir Phosphate Rash   • Sulfa Drugs Swelling and Hives   • Tamiflu      Rash     Outpatient Encounter Medications as of 2/11/2020   Medication Sig Dispense Refill   • colchicine (COLCRYS) 0.6 MG Tab Take 0.6 mg by mouth.     • BIOTIN PO Take  by mouth.     • ONE TOUCH ULTRA TEST strip USE TO TEST UP TO SIX TIMES DAILY AS NEEDED FOR SYMPTOMS OF HIGH OR LOW SUGARS 100 Strip 3   • ONETOUCH DELICA LANCETS 33G Misc USE TO TEST UP TO SIX TIMES PER DAY AS NEEDED FOR SYMPTOMS OF HIGH OR LOW SUGAR 100 Each 3   • Nutritional Supplements (JUICE PLUS FIBRE PO) Take  by mouth.     • Omega-3 Fatty Acids (OMEGA 3 PO) Take  by mouth.     • Insulin Glargine (TOUJEO SOLOSTAR)  300 UNIT/ML Solution Pen-injector Inject 10 Units as instructed every day. 5 PEN 3   • Cyanocobalamin (B-12 PO) Take  by mouth.     • TOUJEO SOLOSTAR 300 UNIT/ML Solution Pen-injector INJECT 10 UNITS SUBCUTANEOUSLY AS INSTRUCTED EVERY DAY 5 PEN 0   • fluticasone (FLONASE) 50 MCG/ACT nasal spray Spray 1 Spray in nose every day. 16 g 1   • B-D UF III MINI PEN NEEDLES 31G X 5 MM Misc USE ONCE DAILY AS DIRECTED 100 Each 3   • Acetaminophen (TYLENOL PO) Take 500 mg by mouth. 1000 mg PRN     • Cholecalciferol (VITAMIN D3) 2000 UNIT Cap Take  by mouth. 10,000 units every other day alternating with 5000 units every other day     • Blood Glucose Monitoring Suppl Device Meter: Dispense Device of Insurance Preference. Sig. Use as directed for blood sugar monitoring. Dx: E11.65 DM,II 1 Device 0   • [DISCONTINUED] mupirocin (BACTROBAN) 2 % Ointment Apply topically as directed twice daily to affected area(s) on open erosions     • [DISCONTINUED] FOLIC ACID PO Take  by mouth.     • [DISCONTINUED] desonide (TRIDESILON) 0.05 % Cream Apply a thin layer to affected area twice daily x 5 days     • [DISCONTINUED] clobetasol (TEMOVATE) 0.05 % Ointment Apply to affected area twice a day as needed     • cyclophosphamide (CYTOXAN) 50 MG Tab Take 50 mg by mouth 2 times a day. 7 days on and 7 days off       No facility-administered encounter medications on file as of 2/11/2020.      Review of Systems   Constitutional: Positive for malaise/fatigue. Negative for chills and fever.   HENT: Negative for sore throat.    Eyes: Negative for blurred vision.   Respiratory: Negative for cough and shortness of breath.    Cardiovascular: Positive for leg swelling. Negative for chest pain, palpitations, claudication and PND.   Gastrointestinal: Negative for abdominal pain and nausea.   Musculoskeletal: Positive for joint pain. Negative for falls.   Skin: Negative for rash.   Neurological: Negative for dizziness, focal weakness and weakness.  "  Endo/Heme/Allergies: Does not bruise/bleed easily.        Objective:   /78 (BP Location: Left arm, Patient Position: Sitting, BP Cuff Size: Adult)   Pulse 80   Ht 1.698 m (5' 6.85\")   Wt (!) 130.5 kg (287 lb 12.8 oz)   SpO2 96%   BMI 45.28 kg/m²     Physical Exam   Constitutional: No distress.   HENT:   Mouth/Throat: Oropharynx is clear and moist. No oropharyngeal exudate.   Eyes: No scleral icterus.   Neck: No JVD present.   Cardiovascular: Normal rate and normal heart sounds. Exam reveals no gallop and no friction rub.   No murmur heard.  Pulmonary/Chest: No respiratory distress. She has no wheezes. She has no rales.   Abdominal: Soft. Bowel sounds are normal.   Musculoskeletal:         General: No edema.   Neurological: She is alert.   Skin: No rash noted. She is not diaphoretic.   Psychiatric: She has a normal mood and affect.       Assessment:     1. SVT (supraventricular tachycardia) (HCC)  Holter Monitor / Event Recorder   2. Venous insufficiency 2/2019 in setting of LE edema left>right     3. Lymphedema of both lower extremities  REFERRAL TO LYMPHEDEMA-PHYSICAL THERAPY  Reason for Therapy: Eval/Treat/Report   4. Bradycardia  Holter Monitor / Event Recorder       Medical Decision Making:  Today's Assessment / Status / Plan:     It was my pleasure to meet with Ms. Carlin.    Unclear how much of her swelling is related to lymphedema she did see the lymphedema clinic at [place] was recommended therapy like to see if he could establish something locally    For her history of SVT and palpitations also concern for bradycardia arrhythmias we should get a longer-term event monitor    We tracked down some of her prior autoimmune testing and I gave her copies and she is seeing rheumatology    I will see Ms. Carlin back in 1 year time and encouraged her to follow up with us over the phone or electronically using my MyChart as issues arise.    It is my pleasure to participate in the care of Ms. " Raegan.  Please do not hesitate to contact me with questions or concerns.    Samuel Vazquez MD PhD [facility]  Cardiologist [facility]    Please note that this dictation was created using voice recognition software. I have worked with consultants from the vendor as well as technical experts from Critical access hospital to optimize the interface. I have made every reasonable attempt to correct obvious errors, but I expect that there are errors of grammar and possibly content I did not discover before finalizing the note.

## 2020-02-27 ENCOUNTER — TELEPHONE (OUTPATIENT)
Dept: CARDIOLOGY | Facility: MEDICAL CENTER | Age: 60
End: 2020-02-27

## 2020-02-27 ENCOUNTER — NON-PROVIDER VISIT (OUTPATIENT)
Dept: CARDIOLOGY | Facility: MEDICAL CENTER | Age: 60
End: 2020-02-27
Payer: COMMERCIAL

## 2020-02-27 DIAGNOSIS — R00.1 BRADYCARDIA: ICD-10-CM

## 2020-02-27 PROCEDURE — 93268 ECG RECORD/REVIEW: CPT | Performed by: INTERNAL MEDICINE

## 2020-02-28 NOTE — TELEPHONE ENCOUNTER
Patient enrolled in the 30 day [employer] [facility] Heart monitoring program per Dr. Vazquez.  >In office hookup with [facility] transmitted.  >Pending EOS.

## 2020-03-18 ENCOUNTER — PATIENT MESSAGE (OUTPATIENT)
Dept: CARDIOLOGY | Facility: MEDICAL CENTER | Age: 60
End: 2020-03-18

## 2020-03-19 ENCOUNTER — PATIENT MESSAGE (OUTPATIENT)
Dept: CARDIOLOGY | Facility: MEDICAL CENTER | Age: 60
End: 2020-03-19

## 2020-03-19 NOTE — PATIENT COMMUNICATION
Called [phone], spoke with Francine, and reviewed findings.  She states she will sent message to Dr. Medley's team regarding request.  Francine provided contact number for medical records:    P: [phone]  F: [phone]    Call transferred to medical records as the telepromptor instructed to fax request.    Fax sent to medical records requesting for records.      Replied to pt via mychart of findings.

## 2020-03-19 NOTE — PATIENT COMMUNICATION
The report from the recent CT is not available in the [facility] records so I do not know the specifics looking at December nothing seemed out of order for the heart records and so we would have to get the primary report from wherever she did the CT scan    Please let her know

## 2020-03-23 NOTE — PATIENT COMMUNICATION
Received notification test results are uploaded into media.    Notification relayed to MD to review and advise.

## 2020-03-23 NOTE — PATIENT COMMUNICATION
Received fax from [facility] regarding CT test results from 12/23/19 and 03/13/2020.      Fax sent to scanning for reference.    Once uploaded to media, this RN will notify MD to review and advise.

## 2020-03-31 DIAGNOSIS — R00.1 BRADYCARDIA: ICD-10-CM

## 2020-03-31 DIAGNOSIS — I47.10 SVT (SUPRAVENTRICULAR TACHYCARDIA) (HCC): ICD-10-CM

## 2020-04-01 ENCOUNTER — TELEPHONE (OUTPATIENT)
Dept: CARDIOLOGY | Facility: MEDICAL CENTER | Age: 60
End: 2020-04-01

## 2020-04-01 NOTE — TELEPHONE ENCOUNTER
Result Notes for Holter Monitor / Event Recorder   Notes recorded by Samuel Vazquez M.D. on 3/31/2020 at 5:17 PM PDT    The holter test looks good, please let her know     Thank you     Letter printed with MD recommendations and mailed to pt mailing address.

## 2020-04-02 ENCOUNTER — PATIENT MESSAGE (OUTPATIENT)
Dept: CARDIOLOGY | Facility: MEDICAL CENTER | Age: 60
End: 2020-04-02

## 2020-04-02 NOTE — PATIENT COMMUNICATION
Please let her know that the ectopy is seen and everyone is just a normal feature of our   heartbeat    According to her lipid panel and risk factors the 10-year risk of heart attack or other complications of atherosclerotic heart disease is: The 10-year ASCVD risk score ([place] WAKE[place] Jr., et al., 2013) is: 6%, this is considerably more than the average person due to the diabetes so everybody with diabetes is recommended statin therapy.    I strongly recommend Lipitor there are multiple studies showing a benefit in her setting    This is of course in addition to heart healthy lifestyle, diet and exercise.  Work on at least 1.5 hours a week of moderate exercise (typical brisk walking or similar activity)  LOW SALT DIET  KEEP YOUR SODIUM EQUAL TO CALORIES AND NO MORE THAN DOUBLE THE CALORIES FOR A LOW SALT DIET  Cardiosmart.org - great resource for American College of Cardiology on heart disease prevention and treatment    Thank you    Lab Results   Component Value Date/Time    CHOLSTRLTOT 237 (H) 03/26/2020 08:27 AM     (H) 03/26/2020 08:27 AM    HDL 63 03/26/2020 08:27 AM    TRIGLYCERIDE 112 03/26/2020 08:27 AM       Lab Results   Component Value Date/Time    SODIUM 141 03/26/2020 08:27 AM    POTASSIUM 4.1 03/26/2020 08:27 AM    CHLORIDE 104 03/26/2020 08:27 AM    CO2 21 03/26/2020 08:27 AM    GLUCOSE 125 (H) 03/26/2020 08:27 AM    BUN 14 03/26/2020 08:27 AM    CREATININE 0.97 03/26/2020 08:27 AM    BUNCREATRAT 14 03/26/2020 08:27 AM     Lab Results   Component Value Date/Time    ALKPHOSPHAT 83 03/26/2020 08:27 AM    ASTSGOT 35 03/26/2020 08:27 AM    ALTSGPT 53 (H) 03/26/2020 08:27 AM    TBILIRUBIN 0.5 03/26/2020 08:27 AM

## 2020-04-07 ENCOUNTER — PATIENT MESSAGE (OUTPATIENT)
Dept: CARDIOLOGY | Facility: MEDICAL CENTER | Age: 60
End: 2020-04-07

## 2020-04-07 NOTE — PATIENT COMMUNICATION
Attempted to call pt, unable to reach.  Left voicemail to review [employer] message or to return this call to discuss MyChart message at her earliest convenience.

## 2020-07-16 ENCOUNTER — HOSPITAL ENCOUNTER (INPATIENT)
Facility: MEDICAL CENTER | Age: 60
LOS: 2 days | DRG: 071 | End: 2020-07-18
Attending: EMERGENCY MEDICINE | Admitting: INTERNAL MEDICINE
Payer: COMMERCIAL

## 2020-07-16 ENCOUNTER — APPOINTMENT (OUTPATIENT)
Dept: RADIOLOGY | Facility: MEDICAL CENTER | Age: 60
DRG: 071 | End: 2020-07-16
Attending: EMERGENCY MEDICINE
Payer: COMMERCIAL

## 2020-07-16 ENCOUNTER — HOSPITAL ENCOUNTER (OUTPATIENT)
Dept: RADIOLOGY | Facility: MEDICAL CENTER | Age: 60
End: 2020-07-16
Payer: COMMERCIAL

## 2020-07-16 DIAGNOSIS — R51.9 CHRONIC INTRACTABLE HEADACHE, UNSPECIFIED HEADACHE TYPE: ICD-10-CM

## 2020-07-16 DIAGNOSIS — R11.2 NON-INTRACTABLE VOMITING WITH NAUSEA, UNSPECIFIED VOMITING TYPE: ICD-10-CM

## 2020-07-16 DIAGNOSIS — G89.29 CHRONIC INTRACTABLE HEADACHE, UNSPECIFIED HEADACHE TYPE: ICD-10-CM

## 2020-07-16 DIAGNOSIS — G93.0 BRAIN CYST: ICD-10-CM

## 2020-07-16 DIAGNOSIS — R42 DIZZINESS: ICD-10-CM

## 2020-07-16 PROBLEM — G93.9 BRAIN LESION: Status: ACTIVE | Noted: 2020-07-16

## 2020-07-16 PROBLEM — C79.9 METASTATIC CARCINOMA (HCC): Status: ACTIVE | Noted: 2020-07-16

## 2020-07-16 LAB
COVID ORDER STATUS COVID19: NORMAL
CRP SERPL HS-MCNC: 0.11 MG/DL (ref 0–0.75)
EKG IMPRESSION: NORMAL
HIV 1+2 AB+HIV1 P24 AG SERPL QL IA: NORMAL
INR PPP: 0.98 (ref 0.87–1.13)
MAGNESIUM SERPL-MCNC: 2 MG/DL (ref 1.5–2.5)
PROTHROMBIN TIME: 13.2 SEC (ref 12–14.6)
SARS-COV-2 RNA RESP QL NAA+PROBE: NOTDETECTED
SPECIMEN SOURCE: NORMAL

## 2020-07-16 PROCEDURE — 700111 HCHG RX REV CODE 636 W/ 250 OVERRIDE (IP): Performed by: EMERGENCY MEDICINE

## 2020-07-16 PROCEDURE — 86140 C-REACTIVE PROTEIN: CPT

## 2020-07-16 PROCEDURE — 99291 CRITICAL CARE FIRST HOUR: CPT

## 2020-07-16 PROCEDURE — 93005 ELECTROCARDIOGRAM TRACING: CPT | Performed by: EMERGENCY MEDICINE

## 2020-07-16 PROCEDURE — 87389 HIV-1 AG W/HIV-1&-2 AB AG IA: CPT

## 2020-07-16 PROCEDURE — 85652 RBC SED RATE AUTOMATED: CPT

## 2020-07-16 PROCEDURE — C9803 HOPD COVID-19 SPEC COLLECT: HCPCS | Performed by: EMERGENCY MEDICINE

## 2020-07-16 PROCEDURE — 770022 HCHG ROOM/CARE - ICU (200)

## 2020-07-16 PROCEDURE — 99223 1ST HOSP IP/OBS HIGH 75: CPT | Mod: GC | Performed by: INTERNAL MEDICINE

## 2020-07-16 PROCEDURE — 96375 TX/PRO/DX INJ NEW DRUG ADDON: CPT

## 2020-07-16 PROCEDURE — 85610 PROTHROMBIN TIME: CPT

## 2020-07-16 PROCEDURE — 36415 COLL VENOUS BLD VENIPUNCTURE: CPT

## 2020-07-16 PROCEDURE — 96374 THER/PROPH/DIAG INJ IV PUSH: CPT

## 2020-07-16 PROCEDURE — U0003 INFECTIOUS AGENT DETECTION BY NUCLEIC ACID (DNA OR RNA); SEVERE ACUTE RESPIRATORY SYNDROME CORONAVIRUS 2 (SARS-COV-2) (CORONAVIRUS DISEASE [COVID-19]), AMPLIFIED PROBE TECHNIQUE, MAKING USE OF HIGH THROUGHPUT TECHNOLOGIES AS DESCRIBED BY CMS-2020-01-R: HCPCS

## 2020-07-16 PROCEDURE — 83735 ASSAY OF MAGNESIUM: CPT

## 2020-07-16 PROCEDURE — 96376 TX/PRO/DX INJ SAME DRUG ADON: CPT

## 2020-07-16 RX ORDER — BISACODYL 10 MG
10 SUPPOSITORY, RECTAL RECTAL
Status: DISCONTINUED | OUTPATIENT
Start: 2020-07-16 | End: 2020-07-18 | Stop reason: HOSPADM

## 2020-07-16 RX ORDER — AMOXICILLIN 250 MG
2 CAPSULE ORAL 2 TIMES DAILY
Status: DISCONTINUED | OUTPATIENT
Start: 2020-07-16 | End: 2020-07-18 | Stop reason: HOSPADM

## 2020-07-16 RX ORDER — HYDRALAZINE HYDROCHLORIDE 20 MG/ML
10 INJECTION INTRAMUSCULAR; INTRAVENOUS ONCE
Status: COMPLETED | OUTPATIENT
Start: 2020-07-16 | End: 2020-07-16

## 2020-07-16 RX ORDER — POLYETHYLENE GLYCOL 3350 17 G/17G
1 POWDER, FOR SOLUTION ORAL
Status: DISCONTINUED | OUTPATIENT
Start: 2020-07-16 | End: 2020-07-18 | Stop reason: HOSPADM

## 2020-07-16 RX ORDER — ACETAMINOPHEN 500 MG
1000 TABLET ORAL 2 TIMES DAILY PRN
COMMUNITY

## 2020-07-16 RX ORDER — LABETALOL HYDROCHLORIDE 5 MG/ML
10 INJECTION, SOLUTION INTRAVENOUS EVERY 4 HOURS PRN
Status: DISCONTINUED | OUTPATIENT
Start: 2020-07-16 | End: 2020-07-18 | Stop reason: HOSPADM

## 2020-07-16 RX ORDER — ACETAMINOPHEN 325 MG/1
650 TABLET ORAL EVERY 6 HOURS PRN
Status: DISCONTINUED | OUTPATIENT
Start: 2020-07-16 | End: 2020-07-18 | Stop reason: HOSPADM

## 2020-07-16 RX ORDER — DEXAMETHASONE SODIUM PHOSPHATE 4 MG/ML
4 INJECTION, SOLUTION INTRA-ARTICULAR; INTRALESIONAL; INTRAMUSCULAR; INTRAVENOUS; SOFT TISSUE EVERY 6 HOURS
Status: DISCONTINUED | OUTPATIENT
Start: 2020-07-16 | End: 2020-07-18 | Stop reason: HOSPADM

## 2020-07-16 RX ORDER — ONDANSETRON 2 MG/ML
8 INJECTION INTRAMUSCULAR; INTRAVENOUS EVERY 8 HOURS PRN
Status: DISCONTINUED | OUTPATIENT
Start: 2020-07-16 | End: 2020-07-18 | Stop reason: HOSPADM

## 2020-07-16 RX ORDER — LABETALOL HYDROCHLORIDE 5 MG/ML
10 INJECTION, SOLUTION INTRAVENOUS ONCE
Status: DISCONTINUED | OUTPATIENT
Start: 2020-07-16 | End: 2020-07-16

## 2020-07-16 RX ORDER — ONDANSETRON 2 MG/ML
4 INJECTION INTRAMUSCULAR; INTRAVENOUS ONCE
Status: COMPLETED | OUTPATIENT
Start: 2020-07-16 | End: 2020-07-16

## 2020-07-16 RX ORDER — METOCLOPRAMIDE HYDROCHLORIDE 5 MG/ML
10 INJECTION INTRAMUSCULAR; INTRAVENOUS ONCE
Status: COMPLETED | OUTPATIENT
Start: 2020-07-16 | End: 2020-07-16

## 2020-07-16 RX ADMIN — ONDANSETRON 4 MG: 2 INJECTION INTRAMUSCULAR; INTRAVENOUS at 20:48

## 2020-07-16 RX ADMIN — ONDANSETRON 4 MG: 2 INJECTION INTRAMUSCULAR; INTRAVENOUS at 22:45

## 2020-07-16 RX ADMIN — HYDRALAZINE HYDROCHLORIDE 10 MG: 20 INJECTION INTRAMUSCULAR; INTRAVENOUS at 21:14

## 2020-07-16 RX ADMIN — METOCLOPRAMIDE 10 MG: 5 INJECTION, SOLUTION INTRAMUSCULAR; INTRAVENOUS at 23:06

## 2020-07-16 RX ADMIN — DEXAMETHASONE SODIUM PHOSPHATE 4 MG: 4 INJECTION, SOLUTION INTRA-ARTICULAR; INTRALESIONAL; INTRAMUSCULAR; INTRAVENOUS; SOFT TISSUE at 21:14

## 2020-07-16 ASSESSMENT — ENCOUNTER SYMPTOMS
NAUSEA: 1
DIZZINESS: 1
VOMITING: 1
FEVER: 0
CHILLS: 0
HEMOPTYSIS: 0
COUGH: 0
BACK PAIN: 0
PALPITATIONS: 0
DOUBLE VISION: 0
BLURRED VISION: 1

## 2020-07-16 ASSESSMENT — LIFESTYLE VARIABLES: SUBSTANCE_ABUSE: 0

## 2020-07-17 LAB
ALBUMIN SERPL BCP-MCNC: 4.1 G/DL (ref 3.2–4.9)
ALBUMIN/GLOB SERPL: 1.3 G/DL
ALP SERPL-CCNC: 65 U/L (ref 30–99)
ALT SERPL-CCNC: 25 U/L (ref 2–50)
ANION GAP SERPL CALC-SCNC: 17 MMOL/L (ref 7–16)
AST SERPL-CCNC: 20 U/L (ref 12–45)
BASOPHILS # BLD AUTO: 0.1 % (ref 0–1.8)
BASOPHILS # BLD: 0.01 K/UL (ref 0–0.12)
BILIRUB SERPL-MCNC: 0.4 MG/DL (ref 0.1–1.5)
BUN SERPL-MCNC: 13 MG/DL (ref 8–22)
CALCIUM SERPL-MCNC: 9 MG/DL (ref 8.5–10.5)
CHLORIDE SERPL-SCNC: 103 MMOL/L (ref 96–112)
CHOLEST SERPL-MCNC: 181 MG/DL (ref 100–199)
CO2 SERPL-SCNC: 16 MMOL/L (ref 20–33)
CREAT SERPL-MCNC: 0.61 MG/DL (ref 0.5–1.4)
EOSINOPHIL # BLD AUTO: 0 K/UL (ref 0–0.51)
EOSINOPHIL NFR BLD: 0 % (ref 0–6.9)
ERYTHROCYTE [DISTWIDTH] IN BLOOD BY AUTOMATED COUNT: 46.8 FL (ref 35.9–50)
ERYTHROCYTE [SEDIMENTATION RATE] IN BLOOD BY WESTERGREN METHOD: 5 MM/HOUR (ref 0–30)
GLOBULIN SER CALC-MCNC: 3.1 G/DL (ref 1.9–3.5)
GLUCOSE BLD-MCNC: 150 MG/DL (ref 65–99)
GLUCOSE BLD-MCNC: 164 MG/DL (ref 65–99)
GLUCOSE BLD-MCNC: 169 MG/DL (ref 65–99)
GLUCOSE BLD-MCNC: 196 MG/DL (ref 65–99)
GLUCOSE BLD-MCNC: 236 MG/DL (ref 65–99)
GLUCOSE SERPL-MCNC: 181 MG/DL (ref 65–99)
HCT VFR BLD AUTO: 40.3 % (ref 37–47)
HDLC SERPL-MCNC: 53 MG/DL
HGB BLD-MCNC: 13.6 G/DL (ref 12–16)
IMM GRANULOCYTES # BLD AUTO: 0.04 K/UL (ref 0–0.11)
IMM GRANULOCYTES NFR BLD AUTO: 0.5 % (ref 0–0.9)
LDLC SERPL CALC-MCNC: 112 MG/DL
LYMPHOCYTES # BLD AUTO: 0.42 K/UL (ref 1–4.8)
LYMPHOCYTES NFR BLD: 5 % (ref 22–41)
MCH RBC QN AUTO: 33.5 PG (ref 27–33)
MCHC RBC AUTO-ENTMCNC: 33.7 G/DL (ref 33.6–35)
MCV RBC AUTO: 99.3 FL (ref 81.4–97.8)
MONOCYTES # BLD AUTO: 0.14 K/UL (ref 0–0.85)
MONOCYTES NFR BLD AUTO: 1.7 % (ref 0–13.4)
NEUTROPHILS # BLD AUTO: 7.86 K/UL (ref 2–7.15)
NEUTROPHILS NFR BLD: 92.7 % (ref 44–72)
NRBC # BLD AUTO: 0 K/UL
NRBC BLD-RTO: 0 /100 WBC
PLATELET # BLD AUTO: 185 K/UL (ref 164–446)
PMV BLD AUTO: 9 FL (ref 9–12.9)
POTASSIUM SERPL-SCNC: 3.8 MMOL/L (ref 3.6–5.5)
PROT SERPL-MCNC: 7.2 G/DL (ref 6–8.2)
RBC # BLD AUTO: 4.06 M/UL (ref 4.2–5.4)
SODIUM SERPL-SCNC: 136 MMOL/L (ref 135–145)
TRIGL SERPL-MCNC: 79 MG/DL (ref 0–149)
WBC # BLD AUTO: 8.5 K/UL (ref 4.8–10.8)

## 2020-07-17 PROCEDURE — 700111 HCHG RX REV CODE 636 W/ 250 OVERRIDE (IP): Performed by: STUDENT IN AN ORGANIZED HEALTH CARE EDUCATION/TRAINING PROGRAM

## 2020-07-17 PROCEDURE — 700102 HCHG RX REV CODE 250 W/ 637 OVERRIDE(OP): Performed by: STUDENT IN AN ORGANIZED HEALTH CARE EDUCATION/TRAINING PROGRAM

## 2020-07-17 PROCEDURE — 306580 SET INFUSION,POWERLOC 20G X.75: Performed by: PSYCHIATRY & NEUROLOGY

## 2020-07-17 PROCEDURE — 82962 GLUCOSE BLOOD TEST: CPT

## 2020-07-17 PROCEDURE — 700117 HCHG RX CONTRAST REV CODE 255: Performed by: EMERGENCY MEDICINE

## 2020-07-17 PROCEDURE — 770022 HCHG ROOM/CARE - ICU (200)

## 2020-07-17 PROCEDURE — 700105 HCHG RX REV CODE 258: Performed by: STUDENT IN AN ORGANIZED HEALTH CARE EDUCATION/TRAINING PROGRAM

## 2020-07-17 PROCEDURE — A9270 NON-COVERED ITEM OR SERVICE: HCPCS | Performed by: STUDENT IN AN ORGANIZED HEALTH CARE EDUCATION/TRAINING PROGRAM

## 2020-07-17 PROCEDURE — 700111 HCHG RX REV CODE 636 W/ 250 OVERRIDE (IP): Performed by: EMERGENCY MEDICINE

## 2020-07-17 PROCEDURE — 99233 SBSQ HOSP IP/OBS HIGH 50: CPT | Mod: GC | Performed by: PSYCHIATRY & NEUROLOGY

## 2020-07-17 PROCEDURE — 700111 HCHG RX REV CODE 636 W/ 250 OVERRIDE (IP): Performed by: INTERNAL MEDICINE

## 2020-07-17 PROCEDURE — 74177 CT ABD & PELVIS W/CONTRAST: CPT

## 2020-07-17 PROCEDURE — 80061 LIPID PANEL: CPT

## 2020-07-17 PROCEDURE — 85025 COMPLETE CBC W/AUTO DIFF WBC: CPT

## 2020-07-17 PROCEDURE — 80053 COMPREHEN METABOLIC PANEL: CPT

## 2020-07-17 RX ORDER — MORPHINE SULFATE 4 MG/ML
4 INJECTION, SOLUTION INTRAMUSCULAR; INTRAVENOUS EVERY 4 HOURS PRN
Status: DISCONTINUED | OUTPATIENT
Start: 2020-07-17 | End: 2020-07-18 | Stop reason: HOSPADM

## 2020-07-17 RX ORDER — HYDRALAZINE HYDROCHLORIDE 20 MG/ML
20 INJECTION INTRAMUSCULAR; INTRAVENOUS EVERY 6 HOURS PRN
Status: DISCONTINUED | OUTPATIENT
Start: 2020-07-17 | End: 2020-07-18 | Stop reason: HOSPADM

## 2020-07-17 RX ORDER — DEXTROSE MONOHYDRATE 25 G/50ML
50 INJECTION, SOLUTION INTRAVENOUS
Status: DISCONTINUED | OUTPATIENT
Start: 2020-07-17 | End: 2020-07-17

## 2020-07-17 RX ORDER — PROCHLORPERAZINE EDISYLATE 5 MG/ML
10 INJECTION INTRAMUSCULAR; INTRAVENOUS EVERY 6 HOURS PRN
Status: DISCONTINUED | OUTPATIENT
Start: 2020-07-17 | End: 2020-07-18 | Stop reason: HOSPADM

## 2020-07-17 RX ORDER — SODIUM CHLORIDE 9 MG/ML
INJECTION, SOLUTION INTRAVENOUS CONTINUOUS
Status: DISCONTINUED | OUTPATIENT
Start: 2020-07-17 | End: 2020-07-18 | Stop reason: HOSPADM

## 2020-07-17 RX ORDER — DEXTROSE MONOHYDRATE 25 G/50ML
50 INJECTION, SOLUTION INTRAVENOUS
Status: DISCONTINUED | OUTPATIENT
Start: 2020-07-17 | End: 2020-07-18 | Stop reason: HOSPADM

## 2020-07-17 RX ORDER — PROCHLORPERAZINE EDISYLATE 5 MG/ML
10 INJECTION INTRAMUSCULAR; INTRAVENOUS EVERY 6 HOURS PRN
Status: CANCELLED | OUTPATIENT
Start: 2020-07-17

## 2020-07-17 RX ORDER — INSULIN GLARGINE 100 [IU]/ML
5 INJECTION, SOLUTION SUBCUTANEOUS EVERY EVENING
Status: DISCONTINUED | OUTPATIENT
Start: 2020-07-17 | End: 2020-07-18

## 2020-07-17 RX ADMIN — PROCHLORPERAZINE EDISYLATE 10 MG: 5 INJECTION INTRAMUSCULAR; INTRAVENOUS at 09:12

## 2020-07-17 RX ADMIN — DEXAMETHASONE SODIUM PHOSPHATE 4 MG: 4 INJECTION, SOLUTION INTRA-ARTICULAR; INTRALESIONAL; INTRAMUSCULAR; INTRAVENOUS; SOFT TISSUE at 18:44

## 2020-07-17 RX ADMIN — INSULIN GLARGINE 5 UNITS: 100 INJECTION, SOLUTION SUBCUTANEOUS at 19:22

## 2020-07-17 RX ADMIN — HYDRALAZINE HYDROCHLORIDE 20 MG: 20 INJECTION INTRAMUSCULAR; INTRAVENOUS at 08:24

## 2020-07-17 RX ADMIN — INSULIN LISPRO 3 UNITS: 100 INJECTION, SOLUTION INTRAVENOUS; SUBCUTANEOUS at 19:25

## 2020-07-17 RX ADMIN — INSULIN HUMAN 2 UNITS: 100 INJECTION, SOLUTION PARENTERAL at 05:54

## 2020-07-17 RX ADMIN — HYDRALAZINE HYDROCHLORIDE 20 MG: 20 INJECTION INTRAMUSCULAR; INTRAVENOUS at 19:21

## 2020-07-17 RX ADMIN — DOCUSATE SODIUM 50 MG AND SENNOSIDES 8.6 MG 2 TABLET: 8.6; 5 TABLET, FILM COATED ORAL at 18:44

## 2020-07-17 RX ADMIN — DEXAMETHASONE SODIUM PHOSPHATE 4 MG: 4 INJECTION, SOLUTION INTRA-ARTICULAR; INTRALESIONAL; INTRAMUSCULAR; INTRAVENOUS; SOFT TISSUE at 05:50

## 2020-07-17 RX ADMIN — DEXAMETHASONE SODIUM PHOSPHATE 4 MG: 4 INJECTION, SOLUTION INTRA-ARTICULAR; INTRALESIONAL; INTRAMUSCULAR; INTRAVENOUS; SOFT TISSUE at 13:14

## 2020-07-17 RX ADMIN — SODIUM CHLORIDE: 9 INJECTION, SOLUTION INTRAVENOUS at 13:19

## 2020-07-17 RX ADMIN — IOHEXOL 100 ML: 350 INJECTION, SOLUTION INTRAVENOUS at 00:05

## 2020-07-17 RX ADMIN — PROCHLORPERAZINE EDISYLATE 10 MG: 5 INJECTION INTRAMUSCULAR; INTRAVENOUS at 20:11

## 2020-07-17 RX ADMIN — MORPHINE SULFATE 4 MG: 4 INJECTION INTRAVENOUS at 19:55

## 2020-07-17 RX ADMIN — PROCHLORPERAZINE EDISYLATE 10 MG: 5 INJECTION INTRAMUSCULAR; INTRAVENOUS at 01:14

## 2020-07-17 ASSESSMENT — ENCOUNTER SYMPTOMS
COUGH: 0
SHORTNESS OF BREATH: 0
FALLS: 0
VOMITING: 1
HALLUCINATIONS: 0
NAUSEA: 1
SORE THROAT: 0
POLYDIPSIA: 0
SPEECH CHANGE: 0
BLURRED VISION: 0
MYALGIAS: 0
ABDOMINAL PAIN: 0
MEMORY LOSS: 0
FOCAL WEAKNESS: 0
FEVER: 0
FLANK PAIN: 0
DOUBLE VISION: 0
CHILLS: 0

## 2020-07-17 ASSESSMENT — COPD QUESTIONNAIRES
COPD SCREENING SCORE: 1
DO YOU EVER COUGH UP ANY MUCUS OR PHLEGM?: NO/ONLY WITH OCCASIONAL COLDS OR INFECTIONS
DURING THE PAST 4 WEEKS HOW MUCH DID YOU FEEL SHORT OF BREATH: NONE/LITTLE OF THE TIME
HAVE YOU SMOKED AT LEAST 100 CIGARETTES IN YOUR ENTIRE LIFE: NO/DON'T KNOW

## 2020-07-17 NOTE — PROGRESS NOTES
[facility] Progress Note      Admit Date: 7/16/2020    Resident(s): Austen Tripp M.D.   Attending:  SWATHI MARIO/ Dr. Coulter    Patient ID:    Name:  Sydney Carlin   YOB: 1960  Age:  59 y.o.  female   MRN:  [id number]    Hospital Course (carried forward and updated):  Sydney Carlin is a 59 y.o. female with PMHx of high grade serous fallopian tube carcinoma s/p neoadjuvant chem w/lymphnode dissection, hysterectomy, bilateral salpingo-oopherectomy, cervical polypectomy, and omentectomy, diabetes mellitus, R hip ortho procedure, and liver steatosis who presented to hospital for headaches, nausea, and vomiting. Notably, on admission patient with an MRI from outside facility that displayed cerebellar mass (cystic vs solid). Neurosurgery was consulted in the ED and per their discussion with the patient we have reached out to transfer center for possible urgent transfer to [place] for continuity of care as she receives the majority of her care at [place].       Consultants:  Critical Care  Neurosurgery     Interval Events:    Patient reports that she is feeling ok this AM. If she stands or sits up she get onset of nausea and headache. Also, reports some difficulty with balance when ambulating recently. Is curious regarding the plan moving forward as she states she has not been seen by Neurosurgery.    Her Oncologist at [place] is Dr. Ania Tran [phone].    -Discussed case with transfer center. They are working on seeing about getting her to [place].  -Neurosurgery tentatively planned for 18th July if unable to transfer to [place].  -PRN antiemetics.  -PRN pain meds.  -Advance diet if she gets hungry and nausea is controlled.       NEURO:   Some weakness of R leg flexion. See physical exam.  CARDIOVASC:  Moderately hypertensive, and bradycardia.  RESPIRATORY:  Room air.  GI/NUTRITION:  Start diet.  RENAL/FLUID/LYTES: New anion gap.  HEME/ONC:   Stable.  INFECTIOUS  D:  NA.  ENDOCRINE:   Mild hyperglycemia.    Vitals Range last 24h:  Temp:  [35.8 °C (96.5 °F)-36.4 °C (97.6 °F)] 36.2 °C (97.2 °F)  Pulse:  [54-94] 90  Resp:  [13-30] 19  BP: (133-185)/(64-91) 148/72  SpO2:  [91 %-99 %] 95 %      Intake/Output Summary (Last 24 hours) at 7/17/2020 1133  Last data filed at 7/17/2020 1024  Gross per 24 hour   Intake 50 ml   Output 2000 ml   Net -1950 ml        Review of Systems   Constitutional: Negative for chills and fever.   HENT: Negative for congestion and sore throat.    Eyes: Negative for blurred vision and double vision.   Respiratory: Negative for cough and shortness of breath.    Cardiovascular: Negative for chest pain and leg swelling.   Gastrointestinal: Positive for nausea and vomiting. Negative for abdominal pain.   Genitourinary: Negative for dysuria and flank pain.   Musculoskeletal: Negative for falls and myalgias.   Skin: Negative for itching and rash.   Neurological: Negative for speech change and focal weakness.   Endo/Heme/Allergies: Negative for polydipsia.   Psychiatric/Behavioral: Negative for hallucinations and memory loss.       PHYSICAL EXAM:  Vitals:    07/17/20 0800 07/17/20 0900 07/17/20 1000 07/17/20 1100   BP: 159/91 (!) 177/81 159/71 148/72   Pulse: (!) 57 85 83 90   Resp: 18 20 20 19   Temp: 36.4 °C (97.5 °F)  36.2 °C (97.2 °F)    TempSrc: Temporal  Temporal    SpO2: 98% 96% 95%    Weight:       Height:        Body mass index is 43.35 kg/m².    O2 therapy: Pulse Oximetry: 95 %, O2 (LPM): 2, O2 Delivery Device: Silicone Nasal Cannula    Date 07/17/20 0700 - 07/18/20 0659   Shift 0124-8348 1989-9279 9930-7402 24 Hour Total   INTAKE   P.O. 50   50     P.O. 50   50   I.V. 0   0     Cardene Volume 0   0   Shift Total 50   50   OUTPUT   Urine 950   950     Number of Times Voided 2 x   2 x     Urine Void (mL) 950   950   Stool         Number of Times Stooled 0 x   0 x   Shift Total 950   950   NET -900   -900        Physical Exam   Constitutional: She is  oriented to person, place, and time and well-developed, well-nourished, and in no distress.   HENT:   Head: Normocephalic and atraumatic.   Mouth/Throat: No oropharyngeal exudate.   Eyes: Pupils are equal, round, and reactive to light. EOM are normal. No scleral icterus.   Neck: Normal range of motion. Neck supple.   Cardiovascular: Regular rhythm. Exam reveals no gallop and no friction rub.   No murmur heard.  Bradycardic.   Pulmonary/Chest: Effort normal and breath sounds normal. No respiratory distress. She has no wheezes. She has no rales.   Abdominal: Soft. Bowel sounds are normal. She exhibits no distension and no mass. There is no abdominal tenderness. There is no rebound and no guarding.   Musculoskeletal: Normal range of motion.         General: No tenderness or edema.   Lymphadenopathy:     She has no cervical adenopathy.   Neurological: She is alert and oriented to person, place, and time. No cranial nerve deficit. She exhibits normal muscle tone.   No disdiadochokinesia. Finger to nose normal.   Skin: Skin is warm. No rash noted. She is not diaphoretic. No erythema.   Psychiatric: Mood, memory, affect and judgment normal.           Meds:  • prochlorperazine  10 mg     • niCARdipine infusion  0-15 mg/hr Stopped (07/17/20 0700)   • hydrALAZINE  20 mg     • MD Alert...Adult ICU Electrolyte Replacement per Pharmacy       • NS       • morphine injection  4 mg     • insulin lispro  0-14 Units     • glucose  16 g      And   • dextrose 50%  50 mL     • insulin glargine  5 Units     • dexamethasone  4 mg     • senna-docusate  2 Tab      And   • polyethylene glycol/lytes  1 Packet      And   • magnesium hydroxide  30 mL      And   • bisacodyl  10 mg     • Respiratory Therapy Consult       • acetaminophen  650 mg     • labetalol  10 mg     • ondansetron  8 mg          Procedures:    NA.    Labs:    Anion gap, mild.  Hyperglycemia, mild.  PT/INR normal.    Imaging:    MRI brain with mass/cyst in cerebellum.  CT  abdomen w/out acute abnormalities.    ASSESSEMENT and PLAN:    * Brain lesion  Assessment & Plan  [facility] brain [facility] w/cyst vs mass. Patient receives lots of care at [place]. Attempting to transfer to [place] for neurosurgery for continuity of care. If unable to transfer will perform surgery here.    -NPO at Midnight  -Decadron 4 mg Q6  -PRN antiemetics in place  -Q2 Neuro checks    Metastatic carcinoma (HCC)  Assessment & Plan  Follows with Dr. Ania Medley at [place]    Fallopian tube cancer, carcinoma, right (HCC)- (present on admission)  Assessment & Plan  Diagnosed in 2016. S/p complete hysterectomy with bilateral oophorectomy in 2016 for metastatic disease. March, 2020 PET showed shrinkage of lymph nodes.    -Follows with Gyn/Onc Dr. Ania Medley at [place] ([phone]).  -CT abdomen/pelvis/chest without acute findings/lymphadenopathy.    Type 2 diabetes mellitus with hyperglycemia (HCC)- (present on admission)  Assessment & Plan  -Insulin lispro sliding scale TID.  -Hypoglycemia protocol.        DISPO: Attempting to transfer to patient to [place]. If no transfer they Neurosurgery on 18th July.    CODE STATUS: FULL    Quality Measures:  Feeding: PO  Analgesia: PRN  Sedation: NA  Thromboprophylaxis: SCDs  Head of bed: >30 degrees  Ulcer prophylaxis: NA  Glycemic control: Correctional: Lispro / Basal: Glargine  Bowel care: bowel regimen  Indwelling lines: PIV  Deescalation of antibiotics: NA      Austen Tripp M.D.

## 2020-07-17 NOTE — CONSULTS
DATE OF SERVICE:  07/17/2020    NEUROSURGICAL CONSULTATION    ADMITTING DIAGNOSES:  Headache, dizziness, nausea.    HISTORY OF PRESENT ILLNESS:  This 59-year-old patient presented to the [facility]   Emergency Room Department as a transfer from the [facility].  Over   the last 3 to 4 days, she has been experiencing frontal headaches associated   with dizziness, nausea and vomiting.  She also describes poor balance and poor  Gait  but she has not fallen to date.  Of note, the patient does have a   history of fallopian cancer, for which she has been receiving treatment and follow up care at [facility] during the   last 10-1/2 years.    The patient denies any other changes in her usual state of health.  She denies   any signs of infection.  She has not traveled.    PAST MEDICAL HISTORY:  1. Fallopian cancer, status post completed treatment at [facility].  2. Sleep apnea.  3. Thyroid nodule.  4. History of right hip replacement.  5. Right ankle reconstruction with fixation.  6. Diabetes mellitus.    SOCIAL HISTORY:  No history of smoking or alcohol abuse.    CURRENT MEDICATIONS:  Include insulin, L-glutamine.    PHYSICAL EXAMINATION:  The patient is awake, alert and oriented.  Cranial   nerves are grossly intact.  No nystagmus.   Finger-to-nose movement maneuver   and rapid finger opposition performed adequately.  She has full upper   extremity and left lower extremity strength.  Right hip flexion is delayed,   but the patient is able to hold her strength with manual strength testing at   4- to 4/5.    IMPRESSION AND PLAN:  1. Right cerebellar multiloculated lesion 3.5x3x2.5 cm per MRI brain with and   without contrast at the [facility].  2. CT scan of chest, pelvis, abdomen, [facility]   07/17/2020, negative except for hepatomegaly.  3. Of note, the patient underwent an MRI of the brain in 05/2020 when no   suspicious lesion was found.  When asked today, the patient's  first choice   would be to be transferred to [place] for further care.  At this time,   transfer planning is in progress.  We will stand by as the further plan of care   is developing.  Potentially, we may consider surgery tomorrow on 07/18/2020.             ____________________________________     LIAT COLE / TA    DD:  07/17/2020 11:24:59  DT:  07/17/2020 12:23:30    D#:  [id number]  Job#:  [id number]

## 2020-07-17 NOTE — PROGRESS NOTES
Per Dr. Sibley plan for surgery tomorrow 7/18 unless patient wishes to go to to [place] for procedure.

## 2020-07-17 NOTE — H&P
Internal Medicine Admitting History and Physical    Note Author: Nick Jeffries M.D.       Name Sydney Carlin     1960   Age/Sex 59 y.o. female   MRN [id number]   Code Status Full code     After 5PM or if no immediate response to page, please call for cross-coverage  Attending/Team: Dr. Huynh/SWATHI Hudson See Patient List for primary contact information  Call [phone] to page    1st Call - Dr. 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  Patient reports symptoms of pressure like frontal lobe headache since Monday that has been constant. Some relief with Decadron in ED.  She also had associated nausea and vomiting x2 since this morning.  Neurosurgery Dr. Brenton ellison was consulted from EDP.  Planning for surgery in a.m.  Pan CT scan pending.  Patient follows with oncology/gynecology at [place], Dr. Ania Medley regarding metastatic fallopian carcinoma diagnosed in 2016      Review of Systems   Constitutional: Negative for chills and fever.   HENT: Negative for ear discharge and ear pain.    Eyes: Positive for blurred vision (since yestarday). Negative for double vision.   Respiratory: Negative for cough and hemoptysis.    Cardiovascular: Negative for chest pain and palpitations.   Gastrointestinal: Positive for nausea and vomiting (x3).   Genitourinary: Negative for dysuria and urgency.   Musculoskeletal: Negative for back pain.   Neurological: Positive for dizziness.   Psychiatric/Behavioral: Negative for substance abuse and suicidal ideas.             Past Medical History (Chronic medical problem, known complications and current treatment)    Past Medical History:   Diagnosis Date   • Allergy     grapes, surya   • Back pain    • Birth control    • Cancer (HCC)      fallopian tube   • Fatty liver    • H/O: pneumonia    • Hyperplastic colon polyp    • Lactose intolerance    • Obesity    • Osteoarthritis    • Seasonal allergic rhinitis 4/24/2017   • Sleep apnea 11/20/2012   • Thyroid nodule 8/15/2013   • Venous insufficiency 2/2019 in setting of LE edema left>right          Past Surgical History:  Past Surgical History:   Procedure Laterality Date   • ANKLE LIGAMENT RECONSTRUCTION  08/2001    right ankle surgery   • LUMPECTOMY  1995    benign   • LUMPECTOMY      benign   • TONSILLECTOMY  6 years old       Current Outpatient Medications:  Home Medications     Reviewed by Phani Richardson (Pharmacy Tech) on 07/16/20 at 2139  Med List Status: Complete   Medication Last Dose Status   acetaminophen (TYLENOL) 500 MG Tab 7/15/2020 Active   Cholecalciferol (VITAMIN D3) 2000 UNIT Cap 7/15/2020 Active   Cyanocobalamin (B-12 PO) 7/15/2020 Active   Insulin Glargine (TOUJEO SOLOSTAR) 300 UNIT/ML Solution Pen-injector 7/16/2020 Active   L-GLUTAMINE PO 7/15/2020 Active   Nutritional Supplements (JUICE PLUS FIBRE PO) 7/16/2020 Active   Omega 3 1000 MG Cap 7/15/2020 Active                Medication Allergy/Sensitivities:  Allergies   Allergen Reactions   • Asa [Aspirin] Rash   • Ibuprofen Rash     t   • Oseltamivir Phosphate Rash   • Sulfa Drugs Swelling and Hives   • Tamiflu      Rash         Family History (mandatory)   Family History   Problem Relation Age of Onset   • Hypertension Mother    • Cancer Father         Lung/Brain   • Arthritis Father    • Diabetes Child    Lung Ca in Father  Prostate Ca in Uncle    Social History (mandatory)   Social History     Socioeconomic History   • Marital status:      Spouse name: Not on file   • Number of children: Not on file   • Years of education: Not on file   • Highest education level: Not on file   Occupational History   • Not on file   Social Needs   • Financial resource strain: Not on file   • Food insecurity     Worry: Not on file      "Inability: Not on file   • Transportation needs     Medical: Not on file     Non-medical: Not on file   Tobacco Use   • Smoking status: Never Smoker   • Smokeless tobacco: Never Used   Substance and Sexual Activity   • Alcohol use: Yes     Alcohol/week: 0.5 oz     Types: 1 Glasses of wine per week     Frequency: Monthly or less     Comment: occasional   • Drug use: No   • Sexual activity: Yes     Partners: Male   Lifestyle   • Physical activity     Days per week: Not on file     Minutes per session: Not on file   • Stress: Not on file   Relationships   • Social connections     Talks on phone: Not on file     Gets together: Not on file     Attends Sikh service: Not on file     Active member of club or organization: Not on file     Attends meetings of clubs or organizations: Not on file     Relationship status: Not on file   • Intimate partner violence     Fear of current or ex partner: Not on file     Emotionally abused: Not on file     Physically abused: Not on file     Forced sexual activity: Not on file   Other Topics Concern   • Not on file   Social History Narrative   • Not on file     PCP : May Valencia D.O.    Physical Exam     Vitals:    07/16/20 2001 07/16/20 2002 07/16/20 2032 07/16/20 2102   BP: (!) 185/84 (!) 185/84 (!) 163/84 157/80   Pulse:  (!) 55 (!) 55 (!) 56   Resp:  14 (!) 21 15   Temp:  36.4 °C (97.6 °F)     TempSrc:  Temporal     SpO2:  97% 99% 97%   Weight:  (!) 129.3 kg (285 lb)     Height:  1.727 m (5' 8\")       Body mass index is 43.33 kg/m².  O2 therapy: Pulse Oximetry: 97 %, O2 Delivery Device: None - Room Air    Physical Exam   Constitutional: She is oriented to person, place, and time and well-developed, well-nourished, and in no distress.   HENT:   Head: Normocephalic and atraumatic.   Eyes: Pupils are equal, round, and reactive to light. EOM are normal.   Neck: Normal range of motion. Neck supple. No thyromegaly present.   Cardiovascular: Normal rate, regular rhythm and intact " distal pulses.   Pulmonary/Chest: Effort normal and breath sounds normal. No respiratory distress.   Abdominal: Soft. Bowel sounds are normal. She exhibits no distension.   Musculoskeletal: Normal range of motion.         General: No deformity or edema.   Neurological: She is alert and oriented to person, place, and time. GCS score is 15.   Skin: Skin is warm and dry. She is not diaphoretic. No erythema.   Psychiatric: Memory, affect and judgment normal.         Data Review       Old Records Request:   Completed  Current Records review/summary: Completed    Lab Data Review:  Recent Results (from the past 24 hour(s))   CRP QUANTITIVE (NON-CARDIAC)    Collection Time: 20  7:57 PM   Result Value Ref Range    Stat C-Reactive Protein 0.11 0.00 - 0.75 mg/dL   Magnesium    Collection Time: 20  7:57 PM   Result Value Ref Range    Magnesium 2.0 1.5 - 2.5 mg/dL   EKG    Collection Time: 20  8:17 PM   Result Value Ref Range    Report       [facility] Emergency Dept.    Test Date:  2020  Pt Name:    AMOR KEENAN               Department: ER  MRN:        [id number]                      Room:       [facility]  Gender:     Female                       Technician: [id number]  :        1960                   Requested By:ER TRIAGE PROTOCOL  Order #:    [id number]                    Reading MD: GABRIELLA VICENTE MD    Measurements  Intervals                                Axis  Rate:       51                           P:          22  MI:         212                          QRS:        -19  QRSD:       98                           T:          5  QT:         468  QTc:        432    Interpretive Statements  SINUS BRADYCARDIA, rate of 51, leftward axis  FIRST DEGREE AV BLOCK  LEFT VENTRICULAR HYPERTROPHY  Compared to ECG 2018 10:46:31  First degree AV block now present  Sinus rhythm no longer present  Electronically Signed On 2020 21:03:57 PDT by GABRIELLA VICENTE MD      COVID/SARS CoV-2 PCR    Collection Time: 07/16/20  8:50 PM    Specimen: Nasopharyngeal; Respirate   Result Value Ref Range    COVID Order Status Received    SARS-CoV-2, PCR (In-House)    Collection Time: 07/16/20  8:50 PM   Result Value Ref Range    SARS-CoV-2 Source NP Swab     SARS-CoV-2 by PCR NotDetected        Imaging/Procedures Review:    Independant Imaging Review: Completed  OUTSIDE IMAGES-MR BRAIN   Final Result      CT-CHEST,ABDOMEN,PELVIS WITH    (Results Pending)      EKG:   EKG Independent Review: Completed  QTc:432, HR: 51, Normal Sinus Rhythm, no ST/T changes     Records reviewed and summarized in current documentation :  Yes  [employer] teaching service handout given to patient:  No         Assessment/Plan     * Brain lesion  Assessment & Plan  Resportedly, pt has marie cyst vs mass per MRI done at [facility]  Neurosurgery on board, Dr. 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 Ania Medley at [place]    Fallopian tube cancer, carcinoma, right (HCC)- (present on admission)  Assessment & Plan  Diagnosed in 2016   Fallopian tube Ca mets to Lymph nodes and Ovaries  S/p complete hysterectomy with bilateral oophorectomy in 2016 March, 2020 PET showed shrinkage of lymph nodes  Follow with Gyn/Onc Dr. Ania Medley at [place]    CT abdomen/pelvis/chest pending    Type 2 diabetes mellitus with hyperglycemia (HCC)- (present on admission)  Assessment & Plan  Controlled ISS with hypoglycemia protocol         Anticipated Hospital stay:  >2  midnights    Quality Measures  Quality-Core Measures   Reviewed items::  EKG reviewed, Labs reviewed, Medications reviewed and Radiology images reviewed  Castillo catheter::  No Castillo  DVT prophylaxis - mechanical:  SCDs    PCP: May Valencia D.O.

## 2020-07-17 NOTE — DISCHARGE PLANNING
Transfer Center:    Phone call received from [place] [facility], conference call with Dr. Coulter with [facility] and Dr. Gregor Gilliland with [place].  Per Dr. Bundy they are willing to accept patient however it may be a few days for a bed to become available.

## 2020-07-17 NOTE — ED PROVIDER NOTES
ED Provider Note    Scribed for Austen Ball M.D. by John Tuttle. 7/16/2020, 8:21 PM.    Primary care provider: May Valencia D.O.  Means of arrival: Ambulance   History obtained from: Patient/transfer report  History limited by: None    CHIEF COMPLAINT  Chief Complaint   Patient presents with   • Sent by MD     Pt transferred from [facility] post-MRI and CT that found a 5.5x3.5mm cyst in the cerebellum   • Headache     Pt report a 2/10 headache and dizziness/nausea when she stands up       HPI  Sydney Carlin is a 59 y.o. female who presents to the Emergency Department as a transfer patient from [facility] for evaluation of a worsening frontal headache onset 3 days ago. Patient admits to associated symptoms of dizziness causing imbalance, emesis (2x today), and blurred vision (1 day). She states that she has not been drifting to one side when she feels dizzy. Patient states that she vomited when she was sitting and became dizzy and nauseous when standing up. Patient reports having intermittent headaches with associated diplopia since May; she states she was evaluated at that time with an MRI and CT which did not show any abnormalities. However, she returned to [facility] earlier today due to her dizziness/imbalance and was found to have a cyst in her brain.  Patient denies chest pain, shortness of breath, nausea, diarrhea, anosmia, or fever. She states that she has not been eating wild game, bear, or large amounts of pork. Patient denies any exposure to known COVID patients. Patient has a history of fallopian tube cancer which she states metastasized to her ovaries. She notes this was treated and resolved, but then was found to have returned in her lymph nodes. She was then on a clinical trial at [facility] for her cancer for 10.5 years, and she states it ended recently. Patient denies travel in the last year or a history of HIV. Patient admits to a history of hernia surgery last May  that had complications and a history of right THR.     PPE Note: I personally donned full PPE for all patient encounters during this visit, including being clean-shaven with an N95 respirator mask, gloves, and eye protection. Scribe remained outside the patient's room and did not have any contact with the patient for the duration of patient encounter.      REVIEW OF SYSTEMS  Pertinent positives include headache, dizziness, nausea, emesis (2x), and blurred vision. Pertinent negatives include chest pain, shortness of breath, diarrhea, changes in taste or smell, or fever. All other systems negative.    PAST MEDICAL HISTORY   has a past medical history of Allergy, Back pain, Birth control, Cancer (HCC), Fatty liver, H/O: pneumonia, Hyperplastic colon polyp, Lactose intolerance, Obesity, Osteoarthritis, Seasonal allergic rhinitis (4/24/2017), Sleep apnea (11/20/2012), Thyroid nodule (8/15/2013), and Venous insufficiency 2/2019 in setting of LE edema left>right.    SURGICAL HISTORY   has a past surgical history that includes tonsillectomy (6 years old); ankle ligament reconstruction (08/2001); lumpectomy; and lumpectomy (1995).    SOCIAL HISTORY  Social History     Tobacco Use   • Smoking status: Never Smoker   • Smokeless tobacco: Never Used   Substance Use Topics   • Alcohol use: Yes     Alcohol/week: 0.5 oz     Types: 1 Glasses of wine per week     Frequency: Monthly or less     Comment: occasional   • Drug use: No      Social History     Substance and Sexual Activity   Drug Use No       FAMILY HISTORY  Family History   Problem Relation Age of Onset   • Hypertension Mother    • Cancer Father         Lung/Brain   • Arthritis Father    • Diabetes Child        CURRENT MEDICATIONS  Home Medications     Reviewed by Phani Richardson (Pharmacy Tech) on 07/16/20 at 2139  Med List Status: Complete   Medication Last Dose Status   acetaminophen (TYLENOL) 500 MG Tab 7/15/2020 Active   Cholecalciferol (VITAMIN D3) 2000 UNIT  "Cap 7/15/2020 Active   Cyanocobalamin (B-12 PO) 7/15/2020 Active   Insulin Glargine (TOUJEO SOLOSTAR) 300 UNIT/ML Solution Pen-injector 7/16/2020 Active   L-GLUTAMINE PO 7/15/2020 Active   Nutritional Supplements (JUICE PLUS FIBRE PO) 7/16/2020 Active   Omega 3 1000 MG Cap 7/15/2020 Active                ALLERGIES  Allergies   Allergen Reactions   • Asa [Aspirin] Rash   • Ibuprofen Rash     t   • Oseltamivir Phosphate Rash   • Sulfa Drugs Swelling and Hives   • Tamiflu      Rash       PHYSICAL EXAM  VITAL SIGNS: BP (!) 185/84   Pulse (!) 52   Temp 36.4 °C (97.6 °F) (Temporal)   Resp 16   Ht 1.727 m (5' 8\")   Wt (!) 129.3 kg (285 lb)   SpO2 97%   BMI 43.33 kg/m²     Constitutional: Well developed, Well nourished, mild distress.   HENT: Normocephalic, Atraumatic, Patient is wearing a mask.  Eyes: Pupils 3mm and reactive, Conjunctiva normal, No discharge.   Cardiovascular: Normal heart rate, Normal rhythm, No murmurs, equal pulses.   Pulmonary: Normal breath sounds, No respiratory distress, No wheezing, No rales, No rhonchi.  Chest: No chest wall tenderness or deformity.   Abdomen: Obese, Soft, No tenderness, No masses, no rebound, no guarding.   Musculoskeletal: No major deformities noted, No tenderness.   Skin: Warm, Dry, No erythema, No rash.   Neurologic: Alert & oriented x 3, Normal motor function,  No focal deficits noted. Normal finger to nose, Normal cranial nerves II-XII, No pronator drift. Equal strength in upper extremities bilaterally. Weakness with flexion in the right hip which the patient believes is due to hip surgery, No Facial droop  Psychiatric: Affect normal, Judgment normal, Mood normal.     LABS  Results for orders placed or performed during the hospital encounter of 07/16/20   HIV AG/AB COMBO ASSAY SCREENING   Result Value Ref Range    HIV Ag/Ab Combo Assay Non-Reactive Non Reactive   COVID/SARS CoV-2 PCR    Specimen: Nasopharyngeal; Respirate   Result Value Ref Range    COVID Order Status " Received    Sed Rate   Result Value Ref Range    Sed Rate Westergren 5 0 - 30 mm/hour   CRP QUANTITIVE (NON-CARDIAC)   Result Value Ref Range    Stat C-Reactive Protein 0.11 0.00 - 0.75 mg/dL   SARS-CoV-2, PCR (In-House)   Result Value Ref Range    SARS-CoV-2 Source NP Swab     SARS-CoV-2 by PCR NotDetected    Magnesium   Result Value Ref Range    Magnesium 2.0 1.5 - 2.5 mg/dL   Prothrombin Time   Result Value Ref Range    PT 13.2 12.0 - 14.6 sec    INR 0.98 0.87 - 1.13   CBC with Differential   Result Value Ref Range    WBC 8.5 4.8 - 10.8 K/uL    RBC 4.06 (L) 4.20 - 5.40 M/uL    Hemoglobin 13.6 12.0 - 16.0 g/dL    Hematocrit 40.3 37.0 - 47.0 %    MCV 99.3 (H) 81.4 - 97.8 fL    MCH 33.5 (H) 27.0 - 33.0 pg    MCHC 33.7 33.6 - 35.0 g/dL    RDW 46.8 35.9 - 50.0 fL    Platelet Count 185 164 - 446 K/uL    MPV 9.0 9.0 - 12.9 fL    Neutrophils-Polys 92.70 (H) 44.00 - 72.00 %    Lymphocytes 5.00 (L) 22.00 - 41.00 %    Monocytes 1.70 0.00 - 13.40 %    Eosinophils 0.00 0.00 - 6.90 %    Basophils 0.10 0.00 - 1.80 %    Immature Granulocytes 0.50 0.00 - 0.90 %    Nucleated RBC 0.00 /100 WBC    Neutrophils (Absolute) 7.86 (H) 2.00 - 7.15 K/uL    Lymphs (Absolute) 0.42 (L) 1.00 - 4.80 K/uL    Monos (Absolute) 0.14 0.00 - 0.85 K/uL    Eos (Absolute) 0.00 0.00 - 0.51 K/uL    Baso (Absolute) 0.01 0.00 - 0.12 K/uL    Immature Granulocytes (abs) 0.04 0.00 - 0.11 K/uL    NRBC (Absolute) 0.00 K/uL   Comp Metabolic Panel (CMP)   Result Value Ref Range    Sodium 136 135 - 145 mmol/L    Potassium 3.8 3.6 - 5.5 mmol/L    Chloride 103 96 - 112 mmol/L    Co2 16 (L) 20 - 33 mmol/L    Anion Gap 17.0 (H) 7.0 - 16.0    Glucose 181 (H) 65 - 99 mg/dL    Bun 13 8 - 22 mg/dL    Creatinine 0.61 0.50 - 1.40 mg/dL    Calcium 9.0 8.5 - 10.5 mg/dL    AST(SGOT) 20 12 - 45 U/L    ALT(SGPT) 25 2 - 50 U/L    Alkaline Phosphatase 65 30 - 99 U/L    Total Bilirubin 0.4 0.1 - 1.5 mg/dL    Albumin 4.1 3.2 - 4.9 g/dL    Total Protein 7.2 6.0 - 8.2 g/dL    Globulin  3.1 1.9 - 3.5 g/dL    A-G Ratio 1.3 g/dL   Lipid Profile   Result Value Ref Range    Cholesterol,Tot 181 100 - 199 mg/dL    Triglycerides 79 0 - 149 mg/dL    HDL 53 >=40 mg/dL     (H) <100 mg/dL   ESTIMATED GFR   Result Value Ref Range    GFR If African American >60 >60 mL/min/1.73 m 2    GFR If Non African American >60 >60 mL/min/1.73 m 2   EKG   Result Value Ref Range    Report       [facility] Emergency Dept.    Test Date:  2020  Pt Name:    AMOR KEENAN               Department: ER  MRN:        [id number]                      Room:       [facility]  Gender:     Female                       Technician: [id number]  :        1960                   Requested By:ER TRIAGE PROTOCOL  Order #:    [id number]                    Reading MD: GABRIELLA VICENTE MD    Measurements  Intervals                                Axis  Rate:       51                           P:          22  IN:         212                          QRS:        -19  QRSD:       98                           T:          5  QT:         468  QTc:        432    Interpretive Statements  SINUS BRADYCARDIA, rate of 51, leftward axis  FIRST DEGREE AV BLOCK  LEFT VENTRICULAR HYPERTROPHY  Compared to ECG 2018 10:46:31  First degree AV block now present  Sinus rhythm no longer present  Electronically Signed On 2020 21:03:57 PDT by GABRIELLA VICENTE MD       All labs reviewed by me.    EKG  12 Lead EKG interpreted by me as shown above.     RADIOLOGY  CT-CHEST,ABDOMEN,PELVIS WITH   Final Result         1.  No acute abnormality.   2.  Hepatomegaly and diffuse hepatic steatosis   3.  Small hiatal hernia      OUTSIDE IMAGES-MR BRAIN   Final Result        The radiologist's interpretation of all radiological studies have been reviewed by me.    COURSE & MEDICAL DECISION MAKING  Pertinent Labs & Imaging studies reviewed. (See chart for details)    Review of past medical records shows the patient had a INR 1.0, WBC 6.7,  "Hemoglobin 14.4, Hematocrit 40.6, Platelet Count 21.4, Sodium 141, Potasium 3.6, Chloride 107, CO2 23, Glucose 114, BUN 13, Creatinine 0.77, Alanine 29, Alkaline Phosphatase 66, Aspartate Transaminase 22, Bilirubin total 0.4,  Bilirubin direct 0.2, and Bilirubin indirect 0.2.   MRI Impression: \"3.5 x 3 x 2.5 cm multiloculated cystic mas in the right cerebellum, new from May 2020 and suspicious for abscess and infection. Adjacent mass effect which results in 5 mm of right to left midline shift in the posterior fossa and the deformation of the midbrain. No abnormal signal within the midbrain.\"    8:21 PM - Patient seen and examined at bedside. Patient will be treated with Zofran 4 mg injection.  Ordered EKG, COVID testing and HIV AG/AB Combo Assay to evaluate her symptoms. The differential diagnoses include but are not limited to: Brain abscess and Brain metastasis     8:48 PM - Paged Neuro Surgery.    8:58 PM -  I discussed the patient's case and the above findings with Dr. Sibley (Neuro Surgery) who recommends ordering CT-Chest/Abdomen/Pelivs. He recommends treating patient with decadron 4mg Q6, bringing down the patient blood pressure by treating with Hydralazine 10 mg IM. He did not advise antibiotics because it does not look like an abscess. Recommends putting in ICU, Q2 Neuro Checks. COVID screening ordered because of surgery, and nothing to eat or drink after 12AM.    9:02  PM - Paged Intensivist.    9:09 PM - I discussed the patient's case and the above findings with Dr. Rob (Intensivist) who will admit the patient for hospitalization.     CRITICAL CARE  I provided critical care services, which included medication orders, frequent reevaluations of the patient's condition and response to treatment, ordering and reviewing test results, and discussing the case with various consultants.  The critical care time associated with the care of the patient was 35 minutes. Review chart for interventions. This time is " exclusive of any other billable procedures.     Medical Decision Making: Presents with a cystic mass in the cerebellum with some shift.  At this point time she will be admitted to the ICU for neuro checks.  She had some signs of slight Cushing's syndrome with hypotension and bradycardia.  Patient was started on dexamethasone neurosurgical consult.    DISPOSITION:  Patient will be hospitalized by Dr. Huynh in critical condition.      FINAL IMPRESSION  1. Brain cyst    2. Chronic intractable headache, unspecified headache type    3. Dizziness    4. Non-intractable vomiting with nausea, unspecified vomiting type    5.      The critical care time associated with the care of the patient was 35       John BELRTAN (Scribe), am scribing for, and in the presence of, Austen Ball M.D.    Electronically signed by: John Tuttle (Jose Albertoibyesika), 7/16/2020    Austen BELTRAN M.D. personally performed the services described in this documentation, as scribed by John Tuttle in my presence, and it is both accurate and complete. C    The note accurately reflects work and decisions made by me.  Austen Ball M.D.  7/17/2020  4:52 AM

## 2020-07-17 NOTE — DIETARY
NUTRITION SERVICES: BMI - Pt with BMI >40 (=Body mass index is 43.35 kg/m².), morbid obesity. Weight loss counseling not appropriate in acute care setting. RECOMMEND - Referral to outpatient nutrition services for weight management after D/C.

## 2020-07-17 NOTE — DISCHARGE PLANNING
Transfer Center:    Phone call received from Dr. Tripp regarding potential patient transfer to [place] for continuity of care.    Phone call placed to the [place] [facility], spoke with Jesse.  All requested information provided. Facesheet faxed to [place] [facility] [place] per request. Fax confirmation received.

## 2020-07-17 NOTE — ED NOTES
Assist RN:  Bedside report given to ADRI Vargas for the patient going to [facility]. The receiving RN was updated on the patient's history PTA and current plan of care. The patient was placed on a cardiac monitor for transport and taken to CT for a second attempt. The patient was transported with an ACLS RN.  Patient is AOx4, no dizziness while lying at 30 degrees, no neuro deficits, nausea controlled, and 2/10 headache. All belongings removed from the room.

## 2020-07-17 NOTE — ED TRIAGE NOTES
Chief Complaint   Patient presents with   • Sent by MD Mcmanus transferred from [facility] post-MRI and CT that found a 5.5x3.5mm cyst in the cerebellum   • Headache     Pt report a 2/10 headache and dizziness/nausea when she stands up     Pt BIB EMS for above complaints. Pt states she has a hx of these symptoms happening in May and had an MRI that didn't find anything.    Pt is hypertensive at 185/84 and bradycardic at 54bpm on arrival.    Pt attached to monitor. Blood drawn and sent to lab. ERP paged overhead

## 2020-07-17 NOTE — PROGRESS NOTES
Bedside report and neurocheck with ADRI Melton. Patient taken to CT via hospital bed on monitor with ACLS RN and CCT. Patient experiencing nausea during CT. VSS. Patient arrived to [facility] at 0005. Dr. Tony aware. Patient vomited 1x. Compazine ordered per MAR.

## 2020-07-17 NOTE — ASSESSMENT & PLAN NOTE
[facility] brain from [facility] w/cyst vs mass. Patient receives lots of care at [facility]. Attempting to transfer to [facility] for neurosurgery for continuity of care. If unable to transfer will perform surgery here.    -NPO at Midnight  -Decadron 4 mg Q6  -PRN antiemetics in place  -Q2 Neuro checks

## 2020-07-17 NOTE — PROGRESS NOTES
Per Dr. Coulter and Dr. Tripp in rounds, keep SBP less than 160.      Dr. Coulter, Dr. Osborne, Dr. Sibley and case management were updated that patient would like to go to [facility] for surgery if possible since her chemo treatments and most of her doctors are there.

## 2020-07-17 NOTE — DISCHARGE PLANNING
Transfer Center:    Phone call placed to [employer], patients insurance company to initiate authorization for transfer request to [facility]. All requested information provided and clinical documents faxed to [phone].    Reference # for [facility] transfer: [id number]    Reference # for inpatient admission at [facility] (not yet completed): [id number]

## 2020-07-17 NOTE — ASSESSMENT & PLAN NOTE
Diagnosed in 2016. S/p complete hysterectomy with bilateral oophorectomy in 2016 for metastatic disease. March, 2020 PET showed shrinkage of lymph nodes.    -Follows with Gyn/Onc Dr. Ania Medley at [facility] ([phone]).  -CT abdomen/pelvis/chest without acute findings/lymphadenopathy.

## 2020-07-17 NOTE — DISCHARGE PLANNING
Transfer Center:    Phone call received from Ricky at [facility]. Ricky is requesting MRI that was done at [facility] be pushed to them.     Phone call placed to film room who is working on request. Updated Ricky at [facility] who will watch for images.

## 2020-07-17 NOTE — ED NOTES
Pt states that she is still nauseous and throwing up. Unable to complete CT at this time. Discussed with CT tech and ERP aware

## 2020-07-17 NOTE — ED NOTES
Pt medicated per MAR. Blood and Covid swab sen to lab. Pt resting in bed, call light in reach and side rails up.

## 2020-07-17 NOTE — PROGRESS NOTES
2 RN skin check complete with Ani, RN   Devices in place SPO2 probe, BP cuff, oxygen, and EKG leads.  Skin assessed under devices every 2 hours. Devices repositioned every 2 hours. Foam used for silicone nasal cannula. Patient educated on importance of repositioning. Patient verbalized understanding. Old scars found on upper thighs.

## 2020-07-17 NOTE — ED NOTES
CT tech called RN to Ct due to pt uncontrollably vomiting. ERP notified and orders for 4mg Zofran given. Zofran administered at CT and pt brought back to room due to not being able to calm down.

## 2020-07-18 VITALS
SYSTOLIC BLOOD PRESSURE: 140 MMHG | DIASTOLIC BLOOD PRESSURE: 82 MMHG | BODY MASS INDEX: 43.2 KG/M2 | RESPIRATION RATE: 23 BRPM | HEIGHT: 68 IN | TEMPERATURE: 98.5 F | WEIGHT: 285.06 LBS | HEART RATE: 86 BPM | OXYGEN SATURATION: 94 %

## 2020-07-18 LAB
ALBUMIN SERPL BCP-MCNC: 4.2 G/DL (ref 3.2–4.9)
ALBUMIN/GLOB SERPL: 1.4 G/DL
ALP SERPL-CCNC: 66 U/L (ref 30–99)
ALT SERPL-CCNC: 22 U/L (ref 2–50)
ANION GAP SERPL CALC-SCNC: 13 MMOL/L (ref 7–16)
AST SERPL-CCNC: 14 U/L (ref 12–45)
BASOPHILS # BLD AUTO: 0.2 % (ref 0–1.8)
BASOPHILS # BLD: 0.02 K/UL (ref 0–0.12)
BILIRUB SERPL-MCNC: 0.4 MG/DL (ref 0.1–1.5)
BUN SERPL-MCNC: 19 MG/DL (ref 8–22)
CALCIUM SERPL-MCNC: 9 MG/DL (ref 8.5–10.5)
CHLORIDE SERPL-SCNC: 105 MMOL/L (ref 96–112)
CO2 SERPL-SCNC: 21 MMOL/L (ref 20–33)
CREAT SERPL-MCNC: 0.62 MG/DL (ref 0.5–1.4)
EOSINOPHIL # BLD AUTO: 0 K/UL (ref 0–0.51)
EOSINOPHIL NFR BLD: 0 % (ref 0–6.9)
ERYTHROCYTE [DISTWIDTH] IN BLOOD BY AUTOMATED COUNT: 47.8 FL (ref 35.9–50)
GLOBULIN SER CALC-MCNC: 3.1 G/DL (ref 1.9–3.5)
GLUCOSE BLD-MCNC: 164 MG/DL (ref 65–99)
GLUCOSE SERPL-MCNC: 180 MG/DL (ref 65–99)
HCT VFR BLD AUTO: 41.8 % (ref 37–47)
HGB BLD-MCNC: 14.2 G/DL (ref 12–16)
IMM GRANULOCYTES # BLD AUTO: 0.04 K/UL (ref 0–0.11)
IMM GRANULOCYTES NFR BLD AUTO: 0.4 % (ref 0–0.9)
LYMPHOCYTES # BLD AUTO: 0.46 K/UL (ref 1–4.8)
LYMPHOCYTES NFR BLD: 4.1 % (ref 22–41)
MCH RBC QN AUTO: 33.3 PG (ref 27–33)
MCHC RBC AUTO-ENTMCNC: 34 G/DL (ref 33.6–35)
MCV RBC AUTO: 97.9 FL (ref 81.4–97.8)
MONOCYTES # BLD AUTO: 0.23 K/UL (ref 0–0.85)
MONOCYTES NFR BLD AUTO: 2.1 % (ref 0–13.4)
NEUTROPHILS # BLD AUTO: 10.35 K/UL (ref 2–7.15)
NEUTROPHILS NFR BLD: 93.2 % (ref 44–72)
NRBC # BLD AUTO: 0 K/UL
NRBC BLD-RTO: 0 /100 WBC
PLATELET # BLD AUTO: 202 K/UL (ref 164–446)
PMV BLD AUTO: 9.4 FL (ref 9–12.9)
POTASSIUM SERPL-SCNC: 4.1 MMOL/L (ref 3.6–5.5)
PROT SERPL-MCNC: 7.3 G/DL (ref 6–8.2)
RBC # BLD AUTO: 4.27 M/UL (ref 4.2–5.4)
SODIUM SERPL-SCNC: 139 MMOL/L (ref 135–145)
WBC # BLD AUTO: 11.1 K/UL (ref 4.8–10.8)

## 2020-07-18 PROCEDURE — 700111 HCHG RX REV CODE 636 W/ 250 OVERRIDE (IP): Performed by: EMERGENCY MEDICINE

## 2020-07-18 PROCEDURE — 700111 HCHG RX REV CODE 636 W/ 250 OVERRIDE (IP): Performed by: PSYCHIATRY & NEUROLOGY

## 2020-07-18 PROCEDURE — 80053 COMPREHEN METABOLIC PANEL: CPT

## 2020-07-18 PROCEDURE — 99238 HOSP IP/OBS DSCHRG MGMT 30/<: CPT | Performed by: PSYCHIATRY & NEUROLOGY

## 2020-07-18 PROCEDURE — A9270 NON-COVERED ITEM OR SERVICE: HCPCS | Performed by: STUDENT IN AN ORGANIZED HEALTH CARE EDUCATION/TRAINING PROGRAM

## 2020-07-18 PROCEDURE — 700111 HCHG RX REV CODE 636 W/ 250 OVERRIDE (IP): Performed by: STUDENT IN AN ORGANIZED HEALTH CARE EDUCATION/TRAINING PROGRAM

## 2020-07-18 PROCEDURE — 700102 HCHG RX REV CODE 250 W/ 637 OVERRIDE(OP): Performed by: STUDENT IN AN ORGANIZED HEALTH CARE EDUCATION/TRAINING PROGRAM

## 2020-07-18 PROCEDURE — 700105 HCHG RX REV CODE 258: Performed by: STUDENT IN AN ORGANIZED HEALTH CARE EDUCATION/TRAINING PROGRAM

## 2020-07-18 PROCEDURE — 82962 GLUCOSE BLOOD TEST: CPT

## 2020-07-18 PROCEDURE — 85025 COMPLETE CBC W/AUTO DIFF WBC: CPT

## 2020-07-18 PROCEDURE — 700111 HCHG RX REV CODE 636 W/ 250 OVERRIDE (IP): Performed by: INTERNAL MEDICINE

## 2020-07-18 RX ORDER — INSULIN GLARGINE 100 [IU]/ML
10 INJECTION, SOLUTION SUBCUTANEOUS EVERY EVENING
Status: DISCONTINUED | OUTPATIENT
Start: 2020-07-18 | End: 2020-07-18 | Stop reason: HOSPADM

## 2020-07-18 RX ADMIN — DEXAMETHASONE SODIUM PHOSPHATE 4 MG: 4 INJECTION, SOLUTION INTRA-ARTICULAR; INTRALESIONAL; INTRAMUSCULAR; INTRAVENOUS; SOFT TISSUE at 11:00

## 2020-07-18 RX ADMIN — ENOXAPARIN SODIUM 40 MG: 40 INJECTION SUBCUTANEOUS at 11:01

## 2020-07-18 RX ADMIN — DEXAMETHASONE SODIUM PHOSPHATE 4 MG: 4 INJECTION, SOLUTION INTRA-ARTICULAR; INTRALESIONAL; INTRAMUSCULAR; INTRAVENOUS; SOFT TISSUE at 04:17

## 2020-07-18 RX ADMIN — ONDANSETRON 8 MG: 2 INJECTION INTRAMUSCULAR; INTRAVENOUS at 10:55

## 2020-07-18 RX ADMIN — DOCUSATE SODIUM 50 MG AND SENNOSIDES 8.6 MG 2 TABLET: 8.6; 5 TABLET, FILM COATED ORAL at 06:30

## 2020-07-18 RX ADMIN — SODIUM CHLORIDE: 9 INJECTION, SOLUTION INTRAVENOUS at 09:11

## 2020-07-18 RX ADMIN — INSULIN LISPRO 2 UNITS: 100 INJECTION, SOLUTION INTRAVENOUS; SUBCUTANEOUS at 08:18

## 2020-07-18 RX ADMIN — PROCHLORPERAZINE EDISYLATE 10 MG: 5 INJECTION INTRAMUSCULAR; INTRAVENOUS at 04:22

## 2020-07-18 RX ADMIN — MORPHINE SULFATE 4 MG: 4 INJECTION INTRAVENOUS at 04:11

## 2020-07-18 ASSESSMENT — PATIENT HEALTH QUESTIONNAIRE - PHQ9
2. FEELING DOWN, DEPRESSED, IRRITABLE, OR HOPELESS: NOT AT ALL
1. LITTLE INTEREST OR PLEASURE IN DOING THINGS: NOT AT ALL
SUM OF ALL RESPONSES TO PHQ9 QUESTIONS 1 AND 2: 0

## 2020-07-18 ASSESSMENT — LIFESTYLE VARIABLES: EVER_SMOKED: NEVER

## 2020-07-18 NOTE — DISCHARGE PLANNING
[facility] Agreement faxed to Janny VILLA RN for MD/patient signatures; when obtained, return to Transfer Center to forward to [facility].    Per Janny VILLA RN - DC Summary to be completed by Neurointensivist Dr. Coulter after shift change.  Transfer Center will forward to [facility] when it is available.    Referral made to [facility]; transfer placed on will-call status pending completion of [facility] requirements for release of bed number.      Per Dada at [facility], patient will be going to neurosurgery unit in the new facility at [address] , [place], [place].    COBRA papers need to be completed once room number is released by [facility].      Update provided to Janny VILLA RN.

## 2020-07-18 NOTE — DISCHARGE SUMMARY
Discharge Summary    Date of Admission: 7/16/2020  Date of Discharge: 18th July 2020  Discharging Attending: Dr. Luis M Coulter  Discharging Senior Resident: Dr. Tripp  Discharging Intern: FLORENCE    CHIEF COMPLAINT ON ADMISSION  Chief Complaint   Patient presents with   • Sent by MD     Pt transferred from [facility] post-MRI and CT that found a 5.5x3.5mm cyst in the cerebellum   • Headache     Pt report a 2/10 headache and dizziness/nausea when she stands up       Reason for Admission  Transfer from outside facility for higher level of care secondary to cyst vs mass in cerebellum.    Admission Date  7/16/2020    CODE STATUS  Full Code    HPI & HOSPITAL COURSE  This is a 59 y.o. 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 Notably, on admission patient presented with an MRI brain from outside facility that displayed cerebellar mass (cystic vs solid). Neurosurgery was consulted in the ED and plan was to take the patient for surgical intervention of cyst vs mass. However, as patient receives significant amount of care at [facility] the treating team and patient felt it prudent to transfer to [facility] for continuity of care. Prior to transfer patient was noted to have continued issues with nausea which was treated with antiemetics with reasonable control of her symptoms. Throughout her course in the ICU her condition remained stable without any acute events.    Therefore, she is transferred in good and stable condition to [facility].    The patient met 2-midnight criteria for an inpatient stay at the time of discharge.    Discharge  Date  07/18/20    FOLLOW UP ITEMS POST DISCHARGE  Neurosurgery for cerebellar mass.  Continued management of chronic diseases.      DISCHARGE DIAGNOSES  Principal Problem:    Brain lesion POA: Unknown  Active Problems:    Type 2 diabetes mellitus with hyperglycemia (HCC) POA: Yes      Overview: Overview:       Dx in 12/2017. On [place], managed by PCP. Checks FBG usually around       120-130    Fallopian tube cancer, carcinoma, right (HCC) POA: Yes    Metastatic carcinoma (HCC) POA: Unknown  Resolved Problems:    * No resolved hospital problems. *      FOLLOW UP  Future Appointments   Date Time Provider Department Center   7/28/2020 10:00 AM PHIL Lopes None     No follow-up provider specified.    MEDICATIONS ON DISCHARGE     Medication List      CONTINUE taking these medications      Instructions   acetaminophen 500 MG Tabs  Commonly known as:  TYLENOL   Take 1,000 mg by mouth 2 times a day as needed for Mild Pain.  Dose:  1,000 mg     B-12 PO   Take 1 tablet by mouth every morning.  Dose:  1 tablet     Insulin Glargine 300 UNIT/ML Sopn  Commonly known as:  Towest SoloStar   Inject 10 Units as instructed every day.  Dose:  10 Units     JUICE PLUS FIBRE PO   Take  by mouth.     L-GLUTAMINE PO   Take 1 tablet by mouth 2 Times a Day.  Dose:  1 tablet     Omega 3 1000 MG Caps   Take 1,000 mg by mouth every day.  Dose:  1,000 mg     Vitamin D3 2000 UNIT Caps   Take  by mouth. 10,000 units every other day alternating with 5000 units every other day            Allergies  Allergies   Allergen Reactions   • Asa [Aspirin] Rash   • Ginger    • Ibuprofen Rash     t   • Oseltamivir Phosphate Rash   • Sulfa Drugs Swelling and Hives   • Tamiflu      Rash       DIET  Orders Placed This Encounter   Procedures   • Diet Order Diabetic (soft small snacks please)     Standing Status:   Standing     Number of Occurrences:   1     Order Specific Question:   Diet:     Answer:   Diabetic [3]     Comments:   soft small snacks  please     Order Specific Question:   Miscellaneous modifications:     Answer:   6 Small Meals [4]       ACTIVITY  As tolerated.  Weight bearing as tolerated    CONSULTATIONS  Dr. Sibley with Neurosurgery consulted.  Treatment options were discussed and plan of care agreed upon.    PROCEDURES  Not applicable.      Austen Tripp M.D.

## 2020-07-18 NOTE — DISCHARGE PLANNING
Received bed placement at [facility].     Patient to transfer today.  [facility]  [address] [place] [address]    Unit [address], bed 26A    Report was all ready received.     [facility] will call back with ETA to bedside.     Colin RAMIREZ informed of above and is getting transfer packet ready.

## 2020-07-18 NOTE — PROGRESS NOTES
Received a call from [facility], stating that there was a bed available. Called our transfer center for further instructions.

## 2020-07-18 NOTE — PROGRESS NOTES
Dr. Sibley came to update patient on plan of care.  Surgery will not be planned for tomorrow and patient does not have to be NPO at midnight.  Dr. Sibley to follow up tomorrow on surgical plan.  Patient to transfer to [facility] when a bed is available.

## 2020-07-18 NOTE — DISCHARGE PLANNING
JAMES got call from transfer center.     Patient being transferred to [facility]: Unit L-4, Bed 26-8    JAMES called [facility] to update.     Completed COBRA, transfer packet now.

## 2020-07-18 NOTE — DISCHARGE PLANNING
Call from Janny Amador RN who states she received call from [facility] notifying that bed is available now for patient.    Call placed to [facility] to confirm: Room is available but [facility] will not release bed number until these iitems are completed and received:    1. DC Summary needs to be completed and faxed over to them  2. TBA needs to be completed/signed and faxed back to [facility] - they will fax it to [facility] for completion & return.  3. Imaging needs to be placed on disc for transfer with patient.    Review of DC planning notes shows:    Images were requested to be pushed to [facility] 7/16/2020.  Film room was called, they cannot see if images were pushed but will push images to [facility] now and also place on disc to travel with patient.    Update to Janny VILLA RN - requested DC Summary from Intensivist. She will request DC Summary from Intensivist, however, it may not be able to be completed and ready for fax to [facility] until the morning.

## 2020-07-18 NOTE — DISCHARGE PLANNING
Received signed transfer back agreement from floor nurse.     Faxed to Karissa at [facility] ( [phone]).    Awaiting bed assignment.

## 2020-07-18 NOTE — PROGRESS NOTES
Neurosurgery Progress Note    Subjective:  Pt states that she is still experiencing some dizziness but it is improving  Pt concerned about insurance coverage for transfer to [facility].  Pt prefers to have care at [facility]  If transfer not possible patient ok for Dr. Sibley to perform procedure.    Exam:  A&Ox4  NAD  RA, normal respiratory effort  Follows commands x4  PERRLA. EOMI. Pupils 3mm  Face is symmetrical, tongue is midline  CN II-XII grossly intact bilat  No difficulty with speech  Negative pronator drift test  No difficulty with rapid alternating movements  Appropriate muscle tone and sensation intact all four extremities.       BP  Min: 117/58  Max: 168/74  Pulse  Av.4  Min: 63  Max: 97  Resp  Av.4  Min: 13  Max: 49  Temp  Av.4 °C (97.6 °F)  Min: 36.2 °C (97.2 °F)  Max: 36.9 °C (98.4 °F)  SpO2  Av.4 %  Min: 89 %  Max: 96 %    No data recorded    Recent Labs     20  0130 20  0810   WBC 8.5 11.1*   RBC 4.06* 4.27   HEMOGLOBIN 13.6 14.2   HEMATOCRIT 40.3 41.8   MCV 99.3* 97.9*   MCH 33.5* 33.3*   MCHC 33.7 34.0   RDW 46.8 47.8   PLATELETCT 185 202   MPV 9.0 9.4     Recent Labs     20  0130 20  0810   SODIUM 136 139   POTASSIUM 3.8 4.1   CHLORIDE 103 105   CO2 16* 21   GLUCOSE 181* 180*   BUN 13 19   CREATININE 0.61 0.62   CALCIUM 9.0 9.0     Recent Labs     20   INR 0.98           Intake/Output       20 07 - 20 0659 20 07 - 20 0659      [id number] Total 2873-3621 5487-0280 Total       Intake    P.O.  100  -- 100  --  -- --    P.O. 100 -- 100 -- -- --    I.V.  234.2  300 534.2  --  -- --    Cardene Volume 0 -- 0 -- -- --    Volume (mL) (NS infusion) 234.2 300 534.2 -- -- --    Total Intake 334.2 300 634.2 -- -- --       Output    Urine  1300  300 1600  --  -- --    Number of Times Voided 3 x 1 x 4 x -- -- --    Urine Void (mL) 2512 287 4795 -- -- --    Stool  --  -- --  --  -- --    Number of Times Stooled 0 x 0 x 0 x  -- -- --    Total Output 3713 395 5533 -- -- --       Net I/O     -965.8 0 -965.8 -- -- --            Intake/Output Summary (Last 24 hours) at 7/18/2020 0946  Last data filed at 7/18/2020 0000  Gross per 24 hour   Intake 634.17 ml   Output 1100 ml   Net -465.83 ml            • insulin glargine  10 Units Q EVENING   • enoxaparin (LOVENOX) injection  40 mg Q12HRS   • prochlorperazine  10 mg Q6HRS PRN   • niCARdipine infusion  0-15 mg/hr Continuous   • hydrALAZINE  20 mg Q6HRS PRN   • MD Alert...Adult ICU Electrolyte Replacement per Pharmacy   PHARMACY TO DOSE   • NS   Continuous   • morphine injection  4 mg Q4HRS PRN   • insulin lispro  0-14 Units TID AC   • glucose  16 g Q15 MIN PRN    And   • dextrose 50%  50 mL Q15 MIN PRN   • dexamethasone  4 mg Q6HRS   • senna-docusate  2 Tab BID    And   • polyethylene glycol/lytes  1 Packet QDAY PRN    And   • magnesium hydroxide  30 mL QDAY PRN    And   • bisacodyl  10 mg QDAY PRN   • Respiratory Therapy Consult   Continuous RT   • acetaminophen  650 mg Q6HRS PRN   • labetalol  10 mg Q4HRS PRN   • ondansetron  8 mg Q8HRS PRN       Assessment and Plan:  Hospital day #3  1. Right cerebellar multiloculated lesion 3.5x3x2.5cm per MRI brain at [facility].  2. CT scan of chest, pelvis, abdomen, [facility]   07/17/2020, negative except for hepatomegaly.  3. Of note, the patient underwent an MRI of the brain in 05/2020 when no   suspicious lesion was found.  When asked today, the patient's first choice   would be to be transferred to [facility] for further care.  At this time,   transfer planning is in progress.  We will stand by as the further plan of care   is developing.  Potentially, we may consider surgery on Sunday or Monday   Ok for PT/OT  Ok for q4hr neuro checks and transfer to floor if not transported to [facility].    Chemical prophylactic DVT therapy: No

## 2020-07-18 NOTE — PROGRESS NOTES
Patient transferred from [facility]. All belongings with patient. Diagnostic CD in chart with [facility] personnel for [facility], per request.

## 2020-09-15 ENCOUNTER — OFFICE VISIT (OUTPATIENT)
Dept: CARDIOLOGY | Facility: MEDICAL CENTER | Age: 60
End: 2020-09-15
Payer: COMMERCIAL

## 2020-09-15 VITALS
DIASTOLIC BLOOD PRESSURE: 60 MMHG | HEART RATE: 76 BPM | BODY MASS INDEX: 44.73 KG/M2 | WEIGHT: 285 LBS | HEIGHT: 67 IN | OXYGEN SATURATION: 96 % | SYSTOLIC BLOOD PRESSURE: 130 MMHG

## 2020-09-15 DIAGNOSIS — I47.10 SVT (SUPRAVENTRICULAR TACHYCARDIA) (HCC): ICD-10-CM

## 2020-09-15 DIAGNOSIS — C79.9 METASTATIC CARCINOMA (HCC): ICD-10-CM

## 2020-09-15 DIAGNOSIS — C57.01 FALLOPIAN TUBE CANCER, CARCINOMA, RIGHT (HCC): ICD-10-CM

## 2020-09-15 DIAGNOSIS — I87.2 VENOUS INSUFFICIENCY: Chronic | ICD-10-CM

## 2020-09-15 DIAGNOSIS — G93.9 BRAIN LESION: ICD-10-CM

## 2020-09-15 PROBLEM — C79.31 METASTASIS TO BRAIN (HCC): Status: ACTIVE | Noted: 2020-07-18

## 2020-09-15 PROBLEM — E11.9 TYPE II DIABETES MELLITUS (HCC): Status: ACTIVE | Noted: 2020-07-20

## 2020-09-15 PROCEDURE — 99214 OFFICE O/P EST MOD 30 MIN: CPT | Performed by: NURSE PRACTITIONER

## 2020-09-15 ASSESSMENT — ENCOUNTER SYMPTOMS
BRUISES/BLEEDS EASILY: 0
INSOMNIA: 0
HEARTBURN: 1
HEADACHES: 0
SHORTNESS OF BREATH: 0
COUGH: 0
MYALGIAS: 0
PALPITATIONS: 0
CHILLS: 0
ORTHOPNEA: 0
LOSS OF CONSCIOUSNESS: 0
ABDOMINAL PAIN: 0
FEVER: 0
DIZZINESS: 0
PND: 0
NAUSEA: 0

## 2020-09-15 ASSESSMENT — FIBROSIS 4 INDEX: FIB4 SCORE: 0.86

## 2020-10-13 ENCOUNTER — APPOINTMENT (RX ONLY)
Dept: URBAN - METROPOLITAN AREA CLINIC 31 | Facility: CLINIC | Age: 60
Setting detail: DERMATOLOGY
End: 2020-10-13

## 2020-10-13 VITALS — TEMPERATURE: 97.3 F

## 2020-10-13 DIAGNOSIS — L82.1 OTHER SEBORRHEIC KERATOSIS: ICD-10-CM

## 2020-10-13 PROCEDURE — ? COUNSELING

## 2020-10-13 PROCEDURE — 99212 OFFICE O/P EST SF 10 MIN: CPT

## 2020-10-13 PROCEDURE — ? OBSERVATION AND MEASURE

## 2020-10-13 ASSESSMENT — LOCATION DETAILED DESCRIPTION DERM: LOCATION DETAILED: RIGHT MEDIAL BREAST 2-3:00 REGION

## 2020-10-13 ASSESSMENT — LOCATION ZONE DERM: LOCATION ZONE: TRUNK

## 2020-10-13 ASSESSMENT — LOCATION SIMPLE DESCRIPTION DERM: LOCATION SIMPLE: RIGHT BREAST

## 2020-12-28 ENCOUNTER — APPOINTMENT (RX ONLY)
Dept: URBAN - METROPOLITAN AREA CLINIC 4 | Facility: CLINIC | Age: 60
Setting detail: DERMATOLOGY
End: 2020-12-28

## 2020-12-28 DIAGNOSIS — L57.8 OTHER SKIN CHANGES DUE TO CHRONIC EXPOSURE TO NONIONIZING RADIATION: ICD-10-CM

## 2020-12-28 DIAGNOSIS — L81.4 OTHER MELANIN HYPERPIGMENTATION: ICD-10-CM

## 2020-12-28 DIAGNOSIS — L82.1 OTHER SEBORRHEIC KERATOSIS: ICD-10-CM

## 2020-12-28 DIAGNOSIS — D22 MELANOCYTIC NEVI: ICD-10-CM

## 2020-12-28 DIAGNOSIS — D18.0 HEMANGIOMA: ICD-10-CM

## 2020-12-28 DIAGNOSIS — Z71.89 OTHER SPECIFIED COUNSELING: ICD-10-CM

## 2020-12-28 PROBLEM — D22.5 MELANOCYTIC NEVI OF TRUNK: Status: ACTIVE | Noted: 2020-12-28

## 2020-12-28 PROBLEM — D18.01 HEMANGIOMA OF SKIN AND SUBCUTANEOUS TISSUE: Status: ACTIVE | Noted: 2020-12-28

## 2020-12-28 PROCEDURE — ? COUNSELING

## 2020-12-28 PROCEDURE — 99214 OFFICE O/P EST MOD 30 MIN: CPT

## 2020-12-28 ASSESSMENT — LOCATION DETAILED DESCRIPTION DERM
LOCATION DETAILED: RIGHT INFERIOR CENTRAL MALAR CHEEK
LOCATION DETAILED: LEFT INFERIOR MEDIAL MIDBACK
LOCATION DETAILED: LEFT INFERIOR CENTRAL MALAR CHEEK
LOCATION DETAILED: RIGHT SUPERIOR MEDIAL MIDBACK
LOCATION DETAILED: INFERIOR THORACIC SPINE
LOCATION DETAILED: LEFT PROXIMAL DORSAL FOREARM
LOCATION DETAILED: RIGHT PROXIMAL DORSAL FOREARM
LOCATION DETAILED: STERNAL NOTCH
LOCATION DETAILED: UPPER STERNUM
LOCATION DETAILED: RIGHT DISTAL DORSAL FOREARM
LOCATION DETAILED: LEFT DISTAL DORSAL FOREARM

## 2020-12-28 ASSESSMENT — LOCATION SIMPLE DESCRIPTION DERM
LOCATION SIMPLE: RIGHT LOWER BACK
LOCATION SIMPLE: LEFT FOREARM
LOCATION SIMPLE: UPPER BACK
LOCATION SIMPLE: LEFT CHEEK
LOCATION SIMPLE: LEFT LOWER BACK
LOCATION SIMPLE: RIGHT FOREARM
LOCATION SIMPLE: CHEST
LOCATION SIMPLE: RIGHT CHEEK

## 2020-12-28 ASSESSMENT — LOCATION ZONE DERM
LOCATION ZONE: TRUNK
LOCATION ZONE: ARM
LOCATION ZONE: FACE

## 2021-01-31 PROBLEM — C79.31 SECONDARY MALIGNANT NEOPLASM OF BRAIN AND SPINAL CORD (HCC): Status: ACTIVE | Noted: 2020-12-15

## 2021-01-31 PROBLEM — C79.49 SECONDARY MALIGNANT NEOPLASM OF BRAIN AND SPINAL CORD (HCC): Status: ACTIVE | Noted: 2020-12-15

## 2021-03-29 ENCOUNTER — APPOINTMENT (RX ONLY)
Dept: URBAN - METROPOLITAN AREA CLINIC 31 | Facility: CLINIC | Age: 61
Setting detail: DERMATOLOGY
End: 2021-03-29

## 2021-03-29 DIAGNOSIS — F42.4 EXCORIATION (SKIN-PICKING) DISORDER: ICD-10-CM

## 2021-03-29 DIAGNOSIS — L85.3 XEROSIS CUTIS: ICD-10-CM

## 2021-03-29 PROBLEM — S20.409A UNSPECIFIED SUPERFICIAL INJURIES OF UNSPECIFIED BACK WALL OF THORAX, INITIAL ENCOUNTER: Status: ACTIVE | Noted: 2021-03-29

## 2021-03-29 PROCEDURE — ? ADDITIONAL NOTES

## 2021-03-29 PROCEDURE — 99213 OFFICE O/P EST LOW 20 MIN: CPT

## 2021-03-29 PROCEDURE — ? COUNSELING

## 2021-03-29 PROCEDURE — ? PHOTO-DOCUMENTATION

## 2021-03-29 ASSESSMENT — LOCATION DETAILED DESCRIPTION DERM
LOCATION DETAILED: LEFT SUPERIOR UPPER BACK
LOCATION DETAILED: RIGHT SUPERIOR MEDIAL LOWER BACK

## 2021-03-29 ASSESSMENT — LOCATION SIMPLE DESCRIPTION DERM
LOCATION SIMPLE: LEFT UPPER BACK
LOCATION SIMPLE: RIGHT LOWER BACK

## 2021-03-29 ASSESSMENT — LOCATION ZONE DERM: LOCATION ZONE: TRUNK

## 2021-03-29 NOTE — PROCEDURE: ADDITIONAL NOTES
Detail Level: Simple
Render Risk Assessment In Note?: no
Additional Notes: we discussed it does not appear suspicious for skin cancer, will observe. we discussed if it itched can try rx for TAC, pt declined.

## 2021-06-09 ENCOUNTER — PRE-ADMISSION TESTING (OUTPATIENT)
Dept: ADMISSIONS | Facility: MEDICAL CENTER | Age: 61
End: 2021-06-09
Attending: ORTHOPAEDIC SURGERY
Payer: COMMERCIAL

## 2021-06-09 DIAGNOSIS — Z01.812 PRE-OPERATIVE LABORATORY EXAMINATION: ICD-10-CM

## 2021-06-09 DIAGNOSIS — Z01.810 PRE-OPERATIVE CARDIOVASCULAR EXAMINATION: ICD-10-CM

## 2021-06-09 LAB
ABO GROUP BLD: NORMAL
ALBUMIN SERPL BCP-MCNC: 4.4 G/DL (ref 3.2–4.9)
ALBUMIN/GLOB SERPL: 1.9 G/DL
ALP SERPL-CCNC: 65 U/L (ref 30–99)
ALT SERPL-CCNC: 28 U/L (ref 2–50)
ANION GAP SERPL CALC-SCNC: 13 MMOL/L (ref 7–16)
APTT PPP: 27.5 SEC (ref 24.7–36)
AST SERPL-CCNC: 25 U/L (ref 12–45)
BILIRUB SERPL-MCNC: 0.4 MG/DL (ref 0.1–1.5)
BUN SERPL-MCNC: 15 MG/DL (ref 8–22)
CALCIUM SERPL-MCNC: 9.3 MG/DL (ref 8.4–10.2)
CHLORIDE SERPL-SCNC: 105 MMOL/L (ref 96–112)
CO2 SERPL-SCNC: 22 MMOL/L (ref 20–33)
CREAT SERPL-MCNC: 0.67 MG/DL (ref 0.5–1.4)
EKG IMPRESSION: NORMAL
ERYTHROCYTE [DISTWIDTH] IN BLOOD BY AUTOMATED COUNT: 46.3 FL (ref 35.9–50)
EST. AVERAGE GLUCOSE BLD GHB EST-MCNC: 148 MG/DL
GLOBULIN SER CALC-MCNC: 2.3 G/DL (ref 1.9–3.5)
GLUCOSE SERPL-MCNC: 95 MG/DL (ref 65–99)
HBA1C MFR BLD: 6.8 % (ref 4–5.6)
HCT VFR BLD AUTO: 39 % (ref 37–47)
HGB BLD-MCNC: 12.9 G/DL (ref 12–16)
INR PPP: 1.03 (ref 0.87–1.13)
MCH RBC QN AUTO: 32.4 PG (ref 27–33)
MCHC RBC AUTO-ENTMCNC: 33.1 G/DL (ref 33.6–35)
MCV RBC AUTO: 98 FL (ref 81.4–97.8)
PLATELET # BLD AUTO: 183 K/UL (ref 164–446)
PMV BLD AUTO: 9.6 FL (ref 9–12.9)
POTASSIUM SERPL-SCNC: 4.1 MMOL/L (ref 3.6–5.5)
PROT SERPL-MCNC: 6.7 G/DL (ref 6–8.2)
PROTHROMBIN TIME: 13.2 SEC (ref 12–14.6)
RBC # BLD AUTO: 3.98 M/UL (ref 4.2–5.4)
RH BLD: NORMAL
SODIUM SERPL-SCNC: 140 MMOL/L (ref 135–145)
WBC # BLD AUTO: 5.1 K/UL (ref 4.8–10.8)

## 2021-06-09 PROCEDURE — 87641 MR-STAPH DNA AMP PROBE: CPT

## 2021-06-09 PROCEDURE — 83036 HEMOGLOBIN GLYCOSYLATED A1C: CPT

## 2021-06-09 PROCEDURE — 36415 COLL VENOUS BLD VENIPUNCTURE: CPT

## 2021-06-09 PROCEDURE — 86900 BLOOD TYPING SEROLOGIC ABO: CPT

## 2021-06-09 PROCEDURE — 80053 COMPREHEN METABOLIC PANEL: CPT

## 2021-06-09 PROCEDURE — 86901 BLOOD TYPING SEROLOGIC RH(D): CPT

## 2021-06-09 PROCEDURE — 93010 ELECTROCARDIOGRAM REPORT: CPT | Performed by: INTERNAL MEDICINE

## 2021-06-09 PROCEDURE — 85027 COMPLETE CBC AUTOMATED: CPT

## 2021-06-09 PROCEDURE — 93005 ELECTROCARDIOGRAM TRACING: CPT

## 2021-06-09 PROCEDURE — 87640 STAPH A DNA AMP PROBE: CPT

## 2021-06-09 PROCEDURE — 85730 THROMBOPLASTIN TIME PARTIAL: CPT

## 2021-06-09 PROCEDURE — 85610 PROTHROMBIN TIME: CPT

## 2021-06-09 RX ORDER — TRAMADOL HYDROCHLORIDE 50 MG/1
50 TABLET ORAL EVERY 4 HOURS PRN
COMMUNITY
End: 2021-09-14

## 2021-06-09 RX ORDER — CARBOXYMETHYLCELLULOSE SODIUM 5 MG/ML
1 SOLUTION/ DROPS OPHTHALMIC
COMMUNITY
Start: 2020-07-23 | End: 2021-07-23

## 2021-06-09 ASSESSMENT — FIBROSIS 4 INDEX: FIB4 SCORE: 1.24

## 2021-06-09 NOTE — DISCHARGE PLANNING
DISCHARGE PLANNING NOTE - TOTAL JOINT    Procedure: Procedure(s):  ARTHROPLASTY, HIP, TOTAL, ANTERIOR APPROACH  Procedure Date: 6/15/2021  Insurance: Payor: [employer] / [employer] / Plan: [employer] / [employer] / Product Type: *No Product type* /    Equipment currently available at home?  front-wheel walker, raised toilet seat and shower chair  Steps into the home? 1  Steps within the home? 0  Toilet height? Standard  Type of shower? walk-in shower  Who will be with you during your recovery? Friends.  Is Outpatient Physical Therapy set up after surgery? Yes  Did you take the Total Joint Class and where? Yes received NAON book today.  Planning same day discharge?No pt states she is staying over one night due to her medical history.    This writer met with pt during her preadmission appointment. Pt states she has all needed equipment. Pt has some friends who will be helping her after surgery, not sure which one will be her transportation as yet. Home safety checklist reviewed and copy given to pt. Pt educated to dc criteria. All questions answered and verbalizes understanding of all instructions. No dc needs identified at this time. Anticipate dc to home without barriers.

## 2021-06-09 NOTE — OR NURSING
"Preadmit appointment: \" Preparing for your Procedure information\" sheet given to patient with verbal and written instructions. Patient instructed to continue prescribed medications through the day before surgery, instructed to take the following medications the day of surgery per anesthesia protocol:  TYLENOL, REFRESH DRY EYE, ULTRAM          Verbal and written instructions on continuing to wear a mask in public prior to surgery and covid symptoms to watch for given to patient, pt advised to notify MD if any symptoms develop.    CBC, CMP, A1C, PT,PTT,  ABO Rh, ECG, NS ORDERED AND COLLECTED 06/09/21.      VERBAL AND WRITTEN TOTAL JOINT SHOWER INSTRUCTIONS GIVEN AND 4% CHG SOAP GIVEN TO PT.    STOP/BANG PROTOCOL INITIATED    PT WITH RIGHT CHEST POWER PORT AND TASNEEM-OP NOTIFIED ON MASTER SCHEDULE.        "

## 2021-06-10 LAB
SCCMEC + MECA PNL NOSE NAA+PROBE: NEGATIVE
SCCMEC + MECA PNL NOSE NAA+PROBE: NEGATIVE

## 2021-06-10 NOTE — OR NURSING
Good evening,    Given the cancer diagnosis with metastatic disease in addition to her comorbidities, it would be best if patient is seen, and evaluated and gets a medical clearance from the PCP. I am not getting a good gauge as to what else may be going on with this patient so that evaluation will be extremely helpful in her perioperative management/course. Thank you.    Ania Orta M.D.  [facility]      On Jun 9, 2021, at 17:01, Rachelle Mueller <[email or web address]> wrote:  ?    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     Thank you for reviewing and advising on this pt.,  Rachelle LOZA, RN           Anesthesia Summary Report           Patient Name: Sydney Carlin MRN: [id number] Admission Date: Patient not admitted   Allergies: Asa [Aspirin], Contrast Media With Iodine [Iodine], Buffy, Ibuprofen, Oseltamivir Phosphate, Sulfa Drugs, Tamiflu

## 2021-06-10 NOTE — OR NURSING
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    Thank you for reviewing and advising on this pt.,  Rachelle LOZA RN        Anesthesia Summary Report           Patient Name: Sydney Carlin MRN: [id number] Admission Date: Patient not admitted

## 2021-06-14 NOTE — OR NURSING
Call placed to Dr. Dotson's office and message left for Kina to follow up on requested surgical clearance

## 2021-06-15 ENCOUNTER — APPOINTMENT (OUTPATIENT)
Dept: RADIOLOGY | Facility: MEDICAL CENTER | Age: 61
End: 2021-06-15
Attending: ORTHOPAEDIC SURGERY
Payer: COMMERCIAL

## 2021-06-15 ENCOUNTER — ANESTHESIA EVENT (OUTPATIENT)
Dept: SURGERY | Facility: MEDICAL CENTER | Age: 61
End: 2021-06-15
Payer: COMMERCIAL

## 2021-06-15 ENCOUNTER — HOSPITAL ENCOUNTER (OUTPATIENT)
Facility: MEDICAL CENTER | Age: 61
End: 2021-06-16
Attending: ORTHOPAEDIC SURGERY | Admitting: ORTHOPAEDIC SURGERY
Payer: COMMERCIAL

## 2021-06-15 ENCOUNTER — ANESTHESIA (OUTPATIENT)
Dept: SURGERY | Facility: MEDICAL CENTER | Age: 61
End: 2021-06-15
Payer: COMMERCIAL

## 2021-06-15 DIAGNOSIS — M16.12 PRIMARY OSTEOARTHRITIS OF LEFT HIP: ICD-10-CM

## 2021-06-15 DIAGNOSIS — G89.18 POSTOPERATIVE PAIN: ICD-10-CM

## 2021-06-15 LAB
ABO GROUP BLD: NORMAL
BLD GP AB SCN SERPL QL: NORMAL
GLUCOSE BLD-MCNC: 106 MG/DL (ref 65–99)
GLUCOSE BLD-MCNC: 140 MG/DL (ref 65–99)
GLUCOSE BLD-MCNC: 184 MG/DL (ref 65–99)
GLUCOSE BLD-MCNC: 197 MG/DL (ref 65–99)
RH BLD: NORMAL

## 2021-06-15 PROCEDURE — 700101 HCHG RX REV CODE 250: Performed by: ANESTHESIOLOGY

## 2021-06-15 PROCEDURE — 700111 HCHG RX REV CODE 636 W/ 250 OVERRIDE (IP): Performed by: ANESTHESIOLOGY

## 2021-06-15 PROCEDURE — 501838 HCHG SUTURE GENERAL: Performed by: ORTHOPAEDIC SURGERY

## 2021-06-15 PROCEDURE — 72170 X-RAY EXAM OF PELVIS: CPT

## 2021-06-15 PROCEDURE — A9270 NON-COVERED ITEM OR SERVICE: HCPCS | Performed by: ORTHOPAEDIC SURGERY

## 2021-06-15 PROCEDURE — 700102 HCHG RX REV CODE 250 W/ 637 OVERRIDE(OP): Performed by: ANESTHESIOLOGY

## 2021-06-15 PROCEDURE — 700101 HCHG RX REV CODE 250: Performed by: ORTHOPAEDIC SURGERY

## 2021-06-15 PROCEDURE — 700111 HCHG RX REV CODE 636 W/ 250 OVERRIDE (IP): Performed by: ORTHOPAEDIC SURGERY

## 2021-06-15 PROCEDURE — 160009 HCHG ANES TIME/MIN: Performed by: ORTHOPAEDIC SURGERY

## 2021-06-15 PROCEDURE — 73501 X-RAY EXAM HIP UNI 1 VIEW: CPT | Mod: LT

## 2021-06-15 PROCEDURE — 27130 TOTAL HIP ARTHROPLASTY: CPT | Mod: LT | Performed by: STUDENT IN AN ORGANIZED HEALTH CARE EDUCATION/TRAINING PROGRAM

## 2021-06-15 PROCEDURE — 110372 HCHG SHELL REV 278: Performed by: ORTHOPAEDIC SURGERY

## 2021-06-15 PROCEDURE — 96372 THER/PROPH/DIAG INJ SC/IM: CPT

## 2021-06-15 PROCEDURE — 86900 BLOOD TYPING SEROLOGIC ABO: CPT

## 2021-06-15 PROCEDURE — 96365 THER/PROPH/DIAG IV INF INIT: CPT

## 2021-06-15 PROCEDURE — A9270 NON-COVERED ITEM OR SERVICE: HCPCS | Performed by: ANESTHESIOLOGY

## 2021-06-15 PROCEDURE — 27130 TOTAL HIP ARTHROPLASTY: CPT | Mod: LT | Performed by: ORTHOPAEDIC SURGERY

## 2021-06-15 PROCEDURE — C1713 ANCHOR/SCREW BN/BN,TIS/BN: HCPCS | Performed by: ORTHOPAEDIC SURGERY

## 2021-06-15 PROCEDURE — 503339 HCHG DRESSSING PICO: Performed by: ORTHOPAEDIC SURGERY

## 2021-06-15 PROCEDURE — 160036 HCHG PACU - EA ADDL 30 MINS PHASE I: Performed by: ORTHOPAEDIC SURGERY

## 2021-06-15 PROCEDURE — C1776 JOINT DEVICE (IMPLANTABLE): HCPCS | Performed by: ORTHOPAEDIC SURGERY

## 2021-06-15 PROCEDURE — 82962 GLUCOSE BLOOD TEST: CPT | Mod: 91

## 2021-06-15 PROCEDURE — 160002 HCHG RECOVERY MINUTES (STAT): Performed by: ORTHOPAEDIC SURGERY

## 2021-06-15 PROCEDURE — 86901 BLOOD TYPING SEROLOGIC RH(D): CPT

## 2021-06-15 PROCEDURE — 700105 HCHG RX REV CODE 258: Performed by: ORTHOPAEDIC SURGERY

## 2021-06-15 PROCEDURE — 160042 HCHG SURGERY MINUTES - EA ADDL 1 MIN LEVEL 5: Performed by: ORTHOPAEDIC SURGERY

## 2021-06-15 PROCEDURE — 502578 HCHG PACK, TOTAL HIP: Performed by: ORTHOPAEDIC SURGERY

## 2021-06-15 PROCEDURE — 86850 RBC ANTIBODY SCREEN: CPT

## 2021-06-15 PROCEDURE — 700111 HCHG RX REV CODE 636 W/ 250 OVERRIDE (IP)

## 2021-06-15 PROCEDURE — 700102 HCHG RX REV CODE 250 W/ 637 OVERRIDE(OP): Performed by: ORTHOPAEDIC SURGERY

## 2021-06-15 PROCEDURE — 94760 N-INVAS EAR/PLS OXIMETRY 1: CPT

## 2021-06-15 PROCEDURE — 160031 HCHG SURGERY MINUTES - 1ST 30 MINS LEVEL 5: Performed by: ORTHOPAEDIC SURGERY

## 2021-06-15 PROCEDURE — 160048 HCHG OR STATISTICAL LEVEL 1-5: Performed by: ORTHOPAEDIC SURGERY

## 2021-06-15 PROCEDURE — G0378 HOSPITAL OBSERVATION PER HR: HCPCS

## 2021-06-15 PROCEDURE — 160035 HCHG PACU - 1ST 60 MINS PHASE I: Performed by: ORTHOPAEDIC SURGERY

## 2021-06-15 DEVICE — IMPLANT REF SPHER HEAD SCREW 25MM (1EA): Type: IMPLANTABLE DEVICE | Site: HIP | Status: FUNCTIONAL

## 2021-06-15 DEVICE — IMPLANT R3 0 DEG XLPE ACET LNR 36MM X 52MM (1EA): Type: IMPLANTABLE DEVICE | Site: HIP | Status: FUNCTIONAL

## 2021-06-15 DEVICE — IMPLANT R3 3 HOLE ACET SHELL 52MM (1EA): Type: IMPLANTABLE DEVICE | Site: HIP | Status: FUNCTIONAL

## 2021-06-15 DEVICE — STEM POLAR CEMENTLESS WITH COLLAR 2: Type: IMPLANTABLE DEVICE | Site: HIP | Status: FUNCTIONAL

## 2021-06-15 DEVICE — IMPLANT OXINIUM FEM HD 12/14 36 MM +0 (1EA): Type: IMPLANTABLE DEVICE | Site: HIP | Status: FUNCTIONAL

## 2021-06-15 DEVICE — IMPLANT REF SPHER HEAD SCREW 35MM (1EA): Type: IMPLANTABLE DEVICE | Site: HIP | Status: FUNCTIONAL

## 2021-06-15 RX ORDER — HYDROMORPHONE HYDROCHLORIDE 1 MG/ML
0.1 INJECTION, SOLUTION INTRAMUSCULAR; INTRAVENOUS; SUBCUTANEOUS
Status: DISCONTINUED | OUTPATIENT
Start: 2021-06-15 | End: 2021-06-15 | Stop reason: HOSPADM

## 2021-06-15 RX ORDER — ACETAMINOPHEN 500 MG
1000 TABLET ORAL EVERY 6 HOURS PRN
Status: DISCONTINUED | OUTPATIENT
Start: 2021-06-20 | End: 2021-06-16 | Stop reason: HOSPADM

## 2021-06-15 RX ORDER — CHLORPROMAZINE HYDROCHLORIDE 25 MG/1
25 TABLET, FILM COATED ORAL EVERY 6 HOURS PRN
Status: DISCONTINUED | OUTPATIENT
Start: 2021-06-15 | End: 2021-06-16 | Stop reason: HOSPADM

## 2021-06-15 RX ORDER — POLYETHYLENE GLYCOL 3350 17 G/17G
1 POWDER, FOR SOLUTION ORAL 2 TIMES DAILY PRN
Status: DISCONTINUED | OUTPATIENT
Start: 2021-06-15 | End: 2021-06-16 | Stop reason: HOSPADM

## 2021-06-15 RX ORDER — AMOXICILLIN 250 MG
1 CAPSULE ORAL
Status: DISCONTINUED | OUTPATIENT
Start: 2021-06-15 | End: 2021-06-16 | Stop reason: HOSPADM

## 2021-06-15 RX ORDER — HYDROMORPHONE HYDROCHLORIDE 2 MG/ML
INJECTION, SOLUTION INTRAMUSCULAR; INTRAVENOUS; SUBCUTANEOUS PRN
Status: DISCONTINUED | OUTPATIENT
Start: 2021-06-15 | End: 2021-06-15 | Stop reason: SURG

## 2021-06-15 RX ORDER — DEXTROSE MONOHYDRATE 25 G/50ML
50 INJECTION, SOLUTION INTRAVENOUS
Status: DISCONTINUED | OUTPATIENT
Start: 2021-06-15 | End: 2021-06-16 | Stop reason: HOSPADM

## 2021-06-15 RX ORDER — OXYCODONE HYDROCHLORIDE 5 MG/1
5 TABLET ORAL
Status: DISCONTINUED | OUTPATIENT
Start: 2021-06-15 | End: 2021-06-16 | Stop reason: HOSPADM

## 2021-06-15 RX ORDER — DIPHENHYDRAMINE HYDROCHLORIDE 50 MG/ML
25 INJECTION INTRAMUSCULAR; INTRAVENOUS EVERY 6 HOURS PRN
Status: DISCONTINUED | OUTPATIENT
Start: 2021-06-15 | End: 2021-06-16 | Stop reason: HOSPADM

## 2021-06-15 RX ORDER — ONDANSETRON 2 MG/ML
INJECTION INTRAMUSCULAR; INTRAVENOUS PRN
Status: DISCONTINUED | OUTPATIENT
Start: 2021-06-15 | End: 2021-06-15 | Stop reason: SURG

## 2021-06-15 RX ORDER — CEFAZOLIN SODIUM 1 G/3ML
INJECTION, POWDER, FOR SOLUTION INTRAMUSCULAR; INTRAVENOUS PRN
Status: DISCONTINUED | OUTPATIENT
Start: 2021-06-15 | End: 2021-06-15 | Stop reason: SURG

## 2021-06-15 RX ORDER — ROPIVACAINE HYDROCHLORIDE 5 MG/ML
INJECTION, SOLUTION EPIDURAL; INFILTRATION; PERINEURAL
Status: DISCONTINUED | OUTPATIENT
Start: 2021-06-15 | End: 2021-06-15 | Stop reason: HOSPADM

## 2021-06-15 RX ORDER — DEXMEDETOMIDINE HYDROCHLORIDE 100 UG/ML
INJECTION, SOLUTION INTRAVENOUS PRN
Status: DISCONTINUED | OUTPATIENT
Start: 2021-06-15 | End: 2021-06-15 | Stop reason: SURG

## 2021-06-15 RX ORDER — TRAMADOL HYDROCHLORIDE 50 MG/1
50 TABLET ORAL EVERY 4 HOURS PRN
Status: DISCONTINUED | OUTPATIENT
Start: 2021-06-15 | End: 2021-06-16 | Stop reason: HOSPADM

## 2021-06-15 RX ORDER — DIPHENHYDRAMINE HCL 25 MG
25 TABLET ORAL EVERY 6 HOURS PRN
Status: DISCONTINUED | OUTPATIENT
Start: 2021-06-15 | End: 2021-06-16 | Stop reason: HOSPADM

## 2021-06-15 RX ORDER — OXYCODONE HYDROCHLORIDE AND ACETAMINOPHEN 5; 325 MG/1; MG/1
1 TABLET ORAL
Status: COMPLETED | OUTPATIENT
Start: 2021-06-15 | End: 2021-06-15

## 2021-06-15 RX ORDER — SODIUM CHLORIDE, SODIUM LACTATE, POTASSIUM CHLORIDE, CALCIUM CHLORIDE 600; 310; 30; 20 MG/100ML; MG/100ML; MG/100ML; MG/100ML
INJECTION, SOLUTION INTRAVENOUS CONTINUOUS
Status: DISCONTINUED | OUTPATIENT
Start: 2021-06-15 | End: 2021-06-15 | Stop reason: HOSPADM

## 2021-06-15 RX ORDER — TRAMADOL HYDROCHLORIDE 50 MG/1
50-100 TABLET ORAL EVERY 6 HOURS PRN
Qty: 80 TABLET | Refills: 0 | Status: SHIPPED | OUTPATIENT
Start: 2021-06-15 | End: 2021-06-25

## 2021-06-15 RX ORDER — ACETAMINOPHEN 500 MG
1000 TABLET ORAL EVERY 6 HOURS
Status: DISCONTINUED | OUTPATIENT
Start: 2021-06-15 | End: 2021-06-16 | Stop reason: HOSPADM

## 2021-06-15 RX ORDER — DEXTROSE MONOHYDRATE 25 G/50ML
50 INJECTION, SOLUTION INTRAVENOUS
Status: DISCONTINUED | OUTPATIENT
Start: 2021-06-15 | End: 2021-06-15 | Stop reason: HOSPADM

## 2021-06-15 RX ORDER — ROCURONIUM BROMIDE 10 MG/ML
INJECTION, SOLUTION INTRAVENOUS PRN
Status: DISCONTINUED | OUTPATIENT
Start: 2021-06-15 | End: 2021-06-15 | Stop reason: SURG

## 2021-06-15 RX ORDER — DEXAMETHASONE SODIUM PHOSPHATE 4 MG/ML
4 INJECTION, SOLUTION INTRA-ARTICULAR; INTRALESIONAL; INTRAMUSCULAR; INTRAVENOUS; SOFT TISSUE
Status: COMPLETED | OUTPATIENT
Start: 2021-06-15 | End: 2021-06-16

## 2021-06-15 RX ORDER — SCOLOPAMINE TRANSDERMAL SYSTEM 1 MG/1
1 PATCH, EXTENDED RELEASE TRANSDERMAL
Status: DISCONTINUED | OUTPATIENT
Start: 2021-06-15 | End: 2021-06-16 | Stop reason: HOSPADM

## 2021-06-15 RX ORDER — ENEMA 19; 7 G/133ML; G/133ML
1 ENEMA RECTAL
Status: DISCONTINUED | OUTPATIENT
Start: 2021-06-15 | End: 2021-06-16 | Stop reason: HOSPADM

## 2021-06-15 RX ORDER — SODIUM CHLORIDE, SODIUM LACTATE, POTASSIUM CHLORIDE, CALCIUM CHLORIDE 600; 310; 30; 20 MG/100ML; MG/100ML; MG/100ML; MG/100ML
INJECTION, SOLUTION INTRAVENOUS CONTINUOUS
Status: ACTIVE | OUTPATIENT
Start: 2021-06-15 | End: 2021-06-15

## 2021-06-15 RX ORDER — ONDANSETRON 2 MG/ML
4 INJECTION INTRAMUSCULAR; INTRAVENOUS
Status: DISCONTINUED | OUTPATIENT
Start: 2021-06-15 | End: 2021-06-15 | Stop reason: HOSPADM

## 2021-06-15 RX ORDER — HYDROMORPHONE HYDROCHLORIDE 1 MG/ML
0.5 INJECTION, SOLUTION INTRAMUSCULAR; INTRAVENOUS; SUBCUTANEOUS
Status: DISCONTINUED | OUTPATIENT
Start: 2021-06-15 | End: 2021-06-16 | Stop reason: HOSPADM

## 2021-06-15 RX ORDER — OXYCODONE HYDROCHLORIDE AND ACETAMINOPHEN 5; 325 MG/1; MG/1
2 TABLET ORAL
Status: COMPLETED | OUTPATIENT
Start: 2021-06-15 | End: 2021-06-15

## 2021-06-15 RX ORDER — OXYCODONE HYDROCHLORIDE 5 MG/1
5-10 TABLET ORAL EVERY 4 HOURS PRN
Qty: 42 TABLET | Refills: 0 | Status: SHIPPED | OUTPATIENT
Start: 2021-06-15 | End: 2021-06-22

## 2021-06-15 RX ORDER — HYDROMORPHONE HYDROCHLORIDE 1 MG/ML
0.4 INJECTION, SOLUTION INTRAMUSCULAR; INTRAVENOUS; SUBCUTANEOUS
Status: DISCONTINUED | OUTPATIENT
Start: 2021-06-15 | End: 2021-06-15 | Stop reason: HOSPADM

## 2021-06-15 RX ORDER — HALOPERIDOL 5 MG/ML
1 INJECTION INTRAMUSCULAR
Status: DISCONTINUED | OUTPATIENT
Start: 2021-06-15 | End: 2021-06-15 | Stop reason: HOSPADM

## 2021-06-15 RX ORDER — CEFAZOLIN SODIUM IN 0.9 % NACL 2 G/100 ML
2 PLASTIC BAG, INJECTION (ML) INTRAVENOUS EVERY 8 HOURS
Status: COMPLETED | OUTPATIENT
Start: 2021-06-15 | End: 2021-06-16

## 2021-06-15 RX ORDER — LIDOCAINE HYDROCHLORIDE 20 MG/ML
INJECTION, SOLUTION EPIDURAL; INFILTRATION; INTRACAUDAL; PERINEURAL PRN
Status: DISCONTINUED | OUTPATIENT
Start: 2021-06-15 | End: 2021-06-15 | Stop reason: SURG

## 2021-06-15 RX ORDER — MEPERIDINE HYDROCHLORIDE 25 MG/ML
12.5 INJECTION INTRAMUSCULAR; INTRAVENOUS; SUBCUTANEOUS
Status: DISCONTINUED | OUTPATIENT
Start: 2021-06-15 | End: 2021-06-15 | Stop reason: HOSPADM

## 2021-06-15 RX ORDER — SODIUM CHLORIDE 9 MG/ML
INJECTION, SOLUTION INTRAMUSCULAR; INTRAVENOUS; SUBCUTANEOUS
Status: DISCONTINUED | OUTPATIENT
Start: 2021-06-15 | End: 2021-06-15 | Stop reason: HOSPADM

## 2021-06-15 RX ORDER — HALOPERIDOL 5 MG/ML
1 INJECTION INTRAMUSCULAR EVERY 6 HOURS PRN
Status: DISCONTINUED | OUTPATIENT
Start: 2021-06-15 | End: 2021-06-16 | Stop reason: HOSPADM

## 2021-06-15 RX ORDER — TRANEXAMIC ACID 100 MG/ML
INJECTION, SOLUTION INTRAVENOUS PRN
Status: DISCONTINUED | OUTPATIENT
Start: 2021-06-15 | End: 2021-06-15 | Stop reason: SURG

## 2021-06-15 RX ORDER — CHLORPROMAZINE HYDROCHLORIDE 25 MG/ML
25 INJECTION INTRAMUSCULAR EVERY 6 HOURS PRN
Status: DISCONTINUED | OUTPATIENT
Start: 2021-06-15 | End: 2021-06-16 | Stop reason: HOSPADM

## 2021-06-15 RX ORDER — DIPHENHYDRAMINE HYDROCHLORIDE 50 MG/ML
12.5 INJECTION INTRAMUSCULAR; INTRAVENOUS
Status: DISCONTINUED | OUTPATIENT
Start: 2021-06-15 | End: 2021-06-15 | Stop reason: HOSPADM

## 2021-06-15 RX ORDER — ONDANSETRON 2 MG/ML
4 INJECTION INTRAMUSCULAR; INTRAVENOUS EVERY 4 HOURS PRN
Status: DISCONTINUED | OUTPATIENT
Start: 2021-06-15 | End: 2021-06-16 | Stop reason: HOSPADM

## 2021-06-15 RX ORDER — DOCUSATE SODIUM 100 MG/1
100 CAPSULE, LIQUID FILLED ORAL 2 TIMES DAILY
Status: DISCONTINUED | OUTPATIENT
Start: 2021-06-15 | End: 2021-06-16 | Stop reason: HOSPADM

## 2021-06-15 RX ORDER — DEXAMETHASONE SODIUM PHOSPHATE 4 MG/ML
10 INJECTION, SOLUTION INTRA-ARTICULAR; INTRALESIONAL; INTRAMUSCULAR; INTRAVENOUS; SOFT TISSUE ONCE
Status: COMPLETED | OUTPATIENT
Start: 2021-06-16 | End: 2021-06-16

## 2021-06-15 RX ORDER — DIPHENHYDRAMINE HCL 25 MG
25 TABLET ORAL NIGHTLY PRN
Status: DISCONTINUED | OUTPATIENT
Start: 2021-06-16 | End: 2021-06-16 | Stop reason: HOSPADM

## 2021-06-15 RX ORDER — EPINEPHRINE 1 MG/ML(1)
AMPUL (ML) INJECTION
Status: DISCONTINUED | OUTPATIENT
Start: 2021-06-15 | End: 2021-06-15 | Stop reason: HOSPADM

## 2021-06-15 RX ORDER — BISACODYL 10 MG
10 SUPPOSITORY, RECTAL RECTAL
Status: DISCONTINUED | OUTPATIENT
Start: 2021-06-15 | End: 2021-06-16 | Stop reason: HOSPADM

## 2021-06-15 RX ORDER — OXYCODONE HYDROCHLORIDE 10 MG/1
10 TABLET ORAL
Status: DISCONTINUED | OUTPATIENT
Start: 2021-06-15 | End: 2021-06-16 | Stop reason: HOSPADM

## 2021-06-15 RX ORDER — VANCOMYCIN HYDROCHLORIDE 1 G/20ML
INJECTION, POWDER, LYOPHILIZED, FOR SOLUTION INTRAVENOUS
Status: COMPLETED | OUTPATIENT
Start: 2021-06-15 | End: 2021-06-15

## 2021-06-15 RX ORDER — DEXAMETHASONE SODIUM PHOSPHATE 4 MG/ML
INJECTION, SOLUTION INTRA-ARTICULAR; INTRALESIONAL; INTRAMUSCULAR; INTRAVENOUS; SOFT TISSUE PRN
Status: DISCONTINUED | OUTPATIENT
Start: 2021-06-15 | End: 2021-06-15 | Stop reason: SURG

## 2021-06-15 RX ORDER — HYDROMORPHONE HYDROCHLORIDE 1 MG/ML
0.2 INJECTION, SOLUTION INTRAMUSCULAR; INTRAVENOUS; SUBCUTANEOUS
Status: DISCONTINUED | OUTPATIENT
Start: 2021-06-15 | End: 2021-06-15 | Stop reason: HOSPADM

## 2021-06-15 RX ORDER — AMOXICILLIN 250 MG
1 CAPSULE ORAL NIGHTLY
Status: DISCONTINUED | OUTPATIENT
Start: 2021-06-15 | End: 2021-06-16 | Stop reason: HOSPADM

## 2021-06-15 RX ADMIN — HYDROMORPHONE HYDROCHLORIDE 0.4 MG: 1 INJECTION, SOLUTION INTRAMUSCULAR; INTRAVENOUS; SUBCUTANEOUS at 14:09

## 2021-06-15 RX ADMIN — TRANEXAMIC ACID 1000 MG: 100 INJECTION, SOLUTION INTRAVENOUS at 13:06

## 2021-06-15 RX ADMIN — ACETAMINOPHEN 1000 MG: 500 TABLET ORAL at 23:08

## 2021-06-15 RX ADMIN — ROCURONIUM BROMIDE 50 MG: 10 INJECTION, SOLUTION INTRAVENOUS at 12:05

## 2021-06-15 RX ADMIN — DOCUSATE SODIUM 100 MG: 100 CAPSULE, LIQUID FILLED ORAL at 17:45

## 2021-06-15 RX ADMIN — TRANEXAMIC ACID 1000 MG: 100 INJECTION, SOLUTION INTRAVENOUS at 13:36

## 2021-06-15 RX ADMIN — CEFAZOLIN 2 G: 1 INJECTION, POWDER, FOR SOLUTION INTRAVENOUS at 12:05

## 2021-06-15 RX ADMIN — SODIUM CHLORIDE, POTASSIUM CHLORIDE, SODIUM LACTATE AND CALCIUM CHLORIDE: 600; 310; 30; 20 INJECTION, SOLUTION INTRAVENOUS at 11:57

## 2021-06-15 RX ADMIN — FENTANYL CITRATE 50 MCG: 50 INJECTION, SOLUTION INTRAMUSCULAR; INTRAVENOUS at 13:31

## 2021-06-15 RX ADMIN — LIDOCAINE HYDROCHLORIDE 100 MG: 20 INJECTION, SOLUTION EPIDURAL; INFILTRATION; INTRACAUDAL; PERINEURAL at 12:05

## 2021-06-15 RX ADMIN — ACETAMINOPHEN 1000 MG: 500 TABLET ORAL at 17:45

## 2021-06-15 RX ADMIN — HYDROMORPHONE HYDROCHLORIDE 0.5 MG: 2 INJECTION, SOLUTION INTRAMUSCULAR; INTRAVENOUS; SUBCUTANEOUS at 12:34

## 2021-06-15 RX ADMIN — TRANEXAMIC ACID 1000 MG: 100 INJECTION, SOLUTION INTRAVENOUS at 12:09

## 2021-06-15 RX ADMIN — PROPOFOL 50 MG: 10 INJECTION, EMULSION INTRAVENOUS at 13:06

## 2021-06-15 RX ADMIN — OXYCODONE HYDROCHLORIDE 10 MG: 10 TABLET ORAL at 18:44

## 2021-06-15 RX ADMIN — DOCUSATE SODIUM 50 MG AND SENNOSIDES 8.6 MG 1 TABLET: 8.6; 5 TABLET, FILM COATED ORAL at 21:54

## 2021-06-15 RX ADMIN — CEFAZOLIN 2 G: 1 INJECTION, POWDER, FOR SOLUTION INTRAVENOUS at 21:59

## 2021-06-15 RX ADMIN — HYDROMORPHONE HYDROCHLORIDE 1 MG: 2 INJECTION, SOLUTION INTRAMUSCULAR; INTRAVENOUS; SUBCUTANEOUS at 12:03

## 2021-06-15 RX ADMIN — FENTANYL CITRATE 50 MCG: 50 INJECTION, SOLUTION INTRAMUSCULAR; INTRAVENOUS at 13:37

## 2021-06-15 RX ADMIN — ONDANSETRON 4 MG: 2 INJECTION INTRAMUSCULAR; INTRAVENOUS at 13:06

## 2021-06-15 RX ADMIN — INSULIN HUMAN 2 UNITS: 100 INJECTION, SOLUTION PARENTERAL at 22:04

## 2021-06-15 RX ADMIN — HYDROMORPHONE HYDROCHLORIDE 0.4 MG: 1 INJECTION, SOLUTION INTRAMUSCULAR; INTRAVENOUS; SUBCUTANEOUS at 13:46

## 2021-06-15 RX ADMIN — DEXMEDETOMIDINE 20 MCG: 200 INJECTION, SOLUTION INTRAVENOUS at 12:44

## 2021-06-15 RX ADMIN — INSULIN HUMAN 2 UNITS: 100 INJECTION, SOLUTION PARENTERAL at 17:44

## 2021-06-15 RX ADMIN — PROPOFOL 200 MG: 10 INJECTION, EMULSION INTRAVENOUS at 12:05

## 2021-06-15 RX ADMIN — HYDROMORPHONE HYDROCHLORIDE 0.4 MG: 1 INJECTION, SOLUTION INTRAMUSCULAR; INTRAVENOUS; SUBCUTANEOUS at 14:00

## 2021-06-15 RX ADMIN — SUGAMMADEX 200 MG: 100 INJECTION, SOLUTION INTRAVENOUS at 13:06

## 2021-06-15 RX ADMIN — DEXAMETHASONE SODIUM PHOSPHATE 4 MG: 4 INJECTION, SOLUTION INTRAMUSCULAR; INTRAVENOUS at 12:05

## 2021-06-15 RX ADMIN — OXYCODONE HYDROCHLORIDE AND ACETAMINOPHEN 2 TABLET: 5; 325 TABLET ORAL at 13:48

## 2021-06-15 RX ADMIN — OXYCODONE HYDROCHLORIDE 5 MG: 5 TABLET ORAL at 21:54

## 2021-06-15 RX ADMIN — PROPOFOL 50 MG: 10 INJECTION, EMULSION INTRAVENOUS at 12:35

## 2021-06-15 ASSESSMENT — PAIN DESCRIPTION - PAIN TYPE
TYPE: SURGICAL PAIN

## 2021-06-15 ASSESSMENT — COGNITIVE AND FUNCTIONAL STATUS - GENERAL
SUGGESTED CMS G CODE MODIFIER DAILY ACTIVITY: CH
MOBILITY SCORE: 24
SUGGESTED CMS G CODE MODIFIER MOBILITY: CH
DAILY ACTIVITIY SCORE: 24

## 2021-06-15 ASSESSMENT — LIFESTYLE VARIABLES
EVER FELT BAD OR GUILTY ABOUT YOUR DRINKING: NO
TOTAL SCORE: 0
AVERAGE NUMBER OF DAYS PER WEEK YOU HAVE A DRINK CONTAINING ALCOHOL: 1
ON A TYPICAL DAY WHEN YOU DRINK ALCOHOL HOW MANY DRINKS DO YOU HAVE: 1
TOTAL SCORE: 0
HOW MANY TIMES IN THE PAST YEAR HAVE YOU HAD 5 OR MORE DRINKS IN A DAY: 0
HAVE PEOPLE ANNOYED YOU BY CRITICIZING YOUR DRINKING: NO
HAVE YOU EVER FELT YOU SHOULD CUT DOWN ON YOUR DRINKING: NO
EVER HAD A DRINK FIRST THING IN THE MORNING TO STEADY YOUR NERVES TO GET RID OF A HANGOVER: NO
ALCOHOL_USE: YES
CONSUMPTION TOTAL: NEGATIVE
TOTAL SCORE: 0

## 2021-06-15 ASSESSMENT — FIBROSIS 4 INDEX: FIB4 SCORE: 1.52

## 2021-06-15 ASSESSMENT — PATIENT HEALTH QUESTIONNAIRE - PHQ9
1. LITTLE INTEREST OR PLEASURE IN DOING THINGS: NOT AT ALL
SUM OF ALL RESPONSES TO PHQ9 QUESTIONS 1 AND 2: 0
2. FEELING DOWN, DEPRESSED, IRRITABLE, OR HOPELESS: NOT AT ALL

## 2021-06-15 NOTE — OP REPORT
Pre-operative Dx: Severe left hip degenerative disease  Post-operative Dx: same  Procedure:  Left total hip arthroplasty  Surgeon:  Dr. Steve Dotson MD  Assistants: Rafat Munson PA-C  Anesthesia:  Dr. Mata.  General anesthetic  EBL:  150 mL  Drains:  None  Complications:  None    Findings: Severe degenerative change.  Excellent press-fit of the acetabular and femoral components.  Leg lengths were equal when checked with intraoperative fluoroscopy as well as palpation at the knees.  Soft tissue tension and stability were appropriate.    Implants:  [employer] R3 size 52 cup with a 36 flat polyethylene liner.  The stem was a size 2 standard offset Polar cementless stem with a 36 + 0 oxinium head.    Indications: Sydney Carlin is a 60 y.o. female who has had severe progressive groin pain that failed conservative management. There were severe degenerative changes on radiographs and physical examination was consistent with intraarticular pathology.  She was ultimately indicated for a hip replacement.  We discussed the risk of bleeding, transfusion, pain, neurovascular injury, leg length discrepancy, dislocation, fracture, infection, wound complication, and medical complication.  No guarantees were made. She was in agreement and elected to proceed.  Discussed increased risk of infection, fracture, and dislocation with her body habitus and history of cancer.    Description of Procedure:    She was identified in the preoperative holding area and informed consent and site marking were confirmed. She was then brought to the operating room. Anesthesia was administered. She was positioned supine on the operative table with appropriate padding of the extremities. Sterile prepping and draping of the surgical field was completed. I performed a pre timeout to confirm we had the correct patient, side, site, presence of necessary personal, and equipment.  I confirmed that pre-operative antibiotics were administered  including Ancef as well as tranexamic acid.    A direct anterior approach to the hip was completed. Anterior capsulotomy was completed for exposure of the hip joint. An in-situ femoral neck osteotomy was completed and the femoral head was extracted from the acetabulum. Circumferential exposure of the acetabulum was completed. Labrectomy was completed. Osteophytes were removed from the acetabular rim. Acetabular reaming was completed under direct visualization. A hemispherical press fit component was inserted using fluoroscopic navigation for alignment and depth.  An acetabular liner was locked in place and checked to ensure proper locking.    Attention was then focused to the femur. The operative leg was placed in extension, external rotation, and adduction. A posterior capsulotomy was completed to assist with femoral mobilization. The femur was then sequentially broached under direct visualization to the desired broach size. The hip was reduced and evaluated using fluoroscopy for component alignment, component sizing, leg length, offset, external rotation to 90 degrees to make sure there was no posterior impingement or anterior subluxation. The hip was dislocated with traction and external rotation. Femoral exposure was repeated. The broach was removed and the final femoral stem was implanted. The final femoral head was placed on a clean and dry trunnion. The hip was reduced. Final fluoroscopic images were completed.    Irrigation of the operative field was followed by layered closure. One gram of Vancomycin powder was left deep in the wound.  The capsule and fascia were closed with a  Quill suture.  Skin closure was completed with subcuticular monocryl, running monocryl, and Dermabond with an overlying occlusive silver dressing. The patient tolerated the procedure well and was taken to the recovery room in stable condition. All counts were correct.      Postoperative plan:  WBAT, DVT ppx for 4 weeks including  Xarelto 10 mg/day, PT/OT eval.  Followup in 2 weeks in clinic.

## 2021-06-15 NOTE — ANESTHESIA PROCEDURE NOTES
Airway    Date/Time: 6/15/2021 12:06 PM  Performed by: Beltran Gipson M.D.  Authorized by: Beltran Gipson M.D.     Location:  OR  Urgency:  Elective  Indications for Airway Management:  Anesthesia      Spontaneous Ventilation: absent    Sedation Level:  Deep  Preoxygenated: Yes    Patient Position:  Sniffing  Mask Difficulty Assessment:  1 - vent by mask  Final Airway Type:  Endotracheal airway  Final Endotracheal Airway:  ETT  Cuffed: Yes    Technique Used for Successful ETT Placement:  Video laryngoscopy    Insertion Site:  Oral  Blade Type:  Glide  Laryngoscope Blade/Videolaryngoscope Blade Size:  3  ETT Size (mm):  7.0  Measured from:  Teeth  ETT to Teeth (cm):  20  Placement Verified by: auscultation and capnometry    Cormack-Lehane Classification:  Grade I - full view of glottis  Number of Attempts at Approach:  1

## 2021-06-15 NOTE — OR NURSING
KATHRYN met with the pt. At bedside. The pt. Lives with her son in a 1st floor apartment with 8 steps to enter. The pt. Reports being independent prior to admission with adls, ambulation and driving. The pt.  Has been using a cane PRN to assist with ambulatio Medical clearance discussed with Dr. Gipson (anesthesia) who states neurosurgery clearance is adequate.

## 2021-06-15 NOTE — OR NURSING
1310 DR. HEATH, VO: MODIFY TRANEXAMIC ACID FROM 2000MG TO 1000MG VI IN PACU. VRB FOLLOW OUT. PHARMACY NOTIFIED    1319 PT RECEIVED IN PACU, REPORT RECEIVED.  VSS, RESP SPONT, EVEN, NON LABORED.    1327 PT REPORTS BURNING L HIP PAIN 8/10 MEDICATE FOR PAIN. SEE MAR.     1340     1357 XRAY AT BEDSIDE.     1359 10/10, DEEP BREATHING, RELAXATION INSTRUCTIONS GIVEN TO TO PT WITH RETURN DEMONSTRATION.  MEDICATE FOR PAIN. SEE MAR.     1407 9/10 PT CONTINUES DEEP BREATHING, RELAXATION. MEDICATE FOR PAIN. SEE MAR     1412 PT CONTINUES RELAXATION, DEEP BREATHING. PT FALLS TO REST.     1425 PT REPORTS L HIP PAIN 7/10 AND TOLERABLE.     1440 VSS. PT MEETS CRITERIA FOR TRANSFER TO ROOM. REPORT GIVEN TO MINERVA RUSH.     1459 REPORT GIVEN TO AJAY RUSH TO ASSUME CARE OF THIS PT.

## 2021-06-15 NOTE — PROGRESS NOTES
Pt arrived to floor. Reports 3/10 pain to L hip. Reports nausea. Pt has emesis bag. BLE pedal pulses 2+, pt able to plantar and dorsiflex. Pt reports baseline numbness (neuropathy) in BLE and BUE. No signs of distress. Discussed POC and fall precautions.

## 2021-06-15 NOTE — ANESTHESIA TIME REPORT
Anesthesia Start and Stop Event Times     Date Time Event    6/15/2021 1157 Anesthesia Start     1224 Ready for Procedure     1326 Anesthesia Stop        Responsible Staff  06/15/21    Name Role Begin End    Beltran Gipson M.D. Anesth 1157 1326        Preop Diagnosis (Free Text):  Pre-op Diagnosis     PRIMARY LOCALIZED OSTEOARTHRITIS, PAIN OF LEFT HIP        Preop Diagnosis (Codes):    Post op Diagnosis  Hip osteoarthritis      Premium Reason  Non-Premium    Comments:

## 2021-06-15 NOTE — OR NURSING
Procedure, patient allergies and NPO status verified. Home med rec completed and belongings secured. Patient verbalizes understanding of pain scale, expected course of stay and plan of care.  IV access established. SCD placed on non operative leg. Triple aim and site prep completed by CNA

## 2021-06-15 NOTE — OR NURSING
1526 Pt taken to room by transport in stable condition on 3 lpm O2 via NC with portable tank @ full psi

## 2021-06-15 NOTE — ANESTHESIA PREPROCEDURE EVALUATION
Relevant Problems   ANESTHESIA   (positive) Obstructive sleep apnea syndrome   (positive) PONV (postoperative nausea and vomiting)   (positive) Sleep apnea      CARDIAC   (positive) SVT (supraventricular tachycardia) (HCC)   (positive) Venous insufficiency 2/2019 in setting of LE edema left>right      GI   (positive) Hiatal hernia with gastroesophageal reflux         (positive) Steatosis of liver      ENDO   (positive) Type 2 diabetes mellitus with hyperglycemia (HCC)   (positive) Type II diabetes mellitus (HCC)      Other   (positive) Brain lesion   (positive) Metastasis to brain (HCC)       Physical Exam    Airway   Mallampati: III  TM distance: >3 FB  Neck ROM: full       Cardiovascular - normal exam  Rhythm: regular  Rate: normal  (-) murmur     Dental - normal exam           Pulmonary - normal exam  Breath sounds clear to auscultation     Abdominal    Neurological - normal exam                 Anesthesia Plan    ASA 3   ASA physical status 3 criteria: morbid obesity - BMI greater than or equal to 40    Plan - general       Airway plan will be ETT          Induction: intravenous    Postoperative Plan: Postoperative administration of opioids is intended.    Pertinent diagnostic labs and testing reviewed    Informed Consent:    Anesthetic plan and risks discussed with patient.    Use of blood products discussed with: patient whom consented to blood products.

## 2021-06-16 VITALS
WEIGHT: 286.6 LBS | TEMPERATURE: 98.2 F | HEIGHT: 68 IN | BODY MASS INDEX: 43.44 KG/M2 | RESPIRATION RATE: 18 BRPM | SYSTOLIC BLOOD PRESSURE: 137 MMHG | DIASTOLIC BLOOD PRESSURE: 73 MMHG | OXYGEN SATURATION: 93 % | HEART RATE: 82 BPM

## 2021-06-16 LAB
GLUCOSE BLD-MCNC: 137 MG/DL (ref 65–99)
GLUCOSE BLD-MCNC: 250 MG/DL (ref 65–99)

## 2021-06-16 PROCEDURE — 700111 HCHG RX REV CODE 636 W/ 250 OVERRIDE (IP): Performed by: ORTHOPAEDIC SURGERY

## 2021-06-16 PROCEDURE — 96375 TX/PRO/DX INJ NEW DRUG ADDON: CPT

## 2021-06-16 PROCEDURE — 82962 GLUCOSE BLOOD TEST: CPT

## 2021-06-16 PROCEDURE — 99024 POSTOP FOLLOW-UP VISIT: CPT | Performed by: ORTHOPAEDIC SURGERY

## 2021-06-16 PROCEDURE — 97535 SELF CARE MNGMENT TRAINING: CPT

## 2021-06-16 PROCEDURE — G0378 HOSPITAL OBSERVATION PER HR: HCPCS

## 2021-06-16 PROCEDURE — 97165 OT EVAL LOW COMPLEX 30 MIN: CPT

## 2021-06-16 PROCEDURE — 97161 PT EVAL LOW COMPLEX 20 MIN: CPT

## 2021-06-16 PROCEDURE — 96372 THER/PROPH/DIAG INJ SC/IM: CPT

## 2021-06-16 PROCEDURE — A9270 NON-COVERED ITEM OR SERVICE: HCPCS | Performed by: ORTHOPAEDIC SURGERY

## 2021-06-16 PROCEDURE — 700102 HCHG RX REV CODE 250 W/ 637 OVERRIDE(OP): Performed by: ORTHOPAEDIC SURGERY

## 2021-06-16 RX ADMIN — DEXAMETHASONE SODIUM PHOSPHATE 10 MG: 4 INJECTION, SOLUTION INTRA-ARTICULAR; INTRALESIONAL; INTRAMUSCULAR; INTRAVENOUS; SOFT TISSUE at 05:02

## 2021-06-16 RX ADMIN — DOCUSATE SODIUM 100 MG: 100 CAPSULE, LIQUID FILLED ORAL at 04:57

## 2021-06-16 RX ADMIN — CEFAZOLIN 2 G: 1 INJECTION, POWDER, FOR SOLUTION INTRAVENOUS at 04:57

## 2021-06-16 RX ADMIN — ACETAMINOPHEN 1000 MG: 500 TABLET ORAL at 04:57

## 2021-06-16 RX ADMIN — INSULIN HUMAN 3 UNITS: 100 INJECTION, SOLUTION PARENTERAL at 11:13

## 2021-06-16 ASSESSMENT — COGNITIVE AND FUNCTIONAL STATUS - GENERAL
TURNING FROM BACK TO SIDE WHILE IN FLAT BAD: A LITTLE
CLIMB 3 TO 5 STEPS WITH RAILING: A LITTLE
HELP NEEDED FOR BATHING: A LITTLE
DAILY ACTIVITIY SCORE: 21
MOBILITY SCORE: 20
MOVING TO AND FROM BED TO CHAIR: A LITTLE
SUGGESTED CMS G CODE MODIFIER DAILY ACTIVITY: CJ
SUGGESTED CMS G CODE MODIFIER MOBILITY: CJ
TOILETING: A LITTLE
DRESSING REGULAR LOWER BODY CLOTHING: A LITTLE
MOVING FROM LYING ON BACK TO SITTING ON SIDE OF FLAT BED: A LITTLE

## 2021-06-16 ASSESSMENT — PAIN DESCRIPTION - PAIN TYPE: TYPE: SURGICAL PAIN

## 2021-06-16 ASSESSMENT — ACTIVITIES OF DAILY LIVING (ADL): TOILETING: INDEPENDENT

## 2021-06-16 ASSESSMENT — GAIT ASSESSMENTS
DISTANCE (FEET): 150
GAIT LEVEL OF ASSIST: SUPERVISED
ASSISTIVE DEVICE: FRONT WHEEL WALKER
DEVIATION: ANTALGIC;STEP TO;DECREASED HEEL STRIKE;DECREASED TOE OFF

## 2021-06-16 ASSESSMENT — PAIN SCALES - GENERAL: PAIN_LEVEL: 1

## 2021-06-16 NOTE — CARE PLAN
Problem: Pain - Post Surgery  Goal: Alleviation or reduction of pain post surgery  Outcome: Progressing     Problem: Respiratory/Oxygenation Function Post-Surgical  Goal: Patient will achieve/maintain normal respiratory rate/effort  Outcome: Progressing   The patient is Stable - Low risk of patient condition declining or worsening    Shift Goals  Clinical Goals: ambulate, void, pain control  Patient Goals: pain and nausea control    Progress made toward(s) clinical / shift goals:  Pt's O2 sat 97% on RA. Pt uses incentive spirometer. Pt reports tolerable pain level.    Patient is not progressing towards the following goals: Pt. Needs to void and walk.

## 2021-06-16 NOTE — DISCHARGE SUMMARY
Patient was admitted for a total hip arthroplasty.  There were no complications during the surgery. Did well post-operatively.               There are no active hospital problems to display for this patient.      Uneventful hospital course.     Medication List      START taking these medications      Instructions   oxyCODONE immediate-release 5 MG Tabs  Commonly known as: ROXICODONE   Take 1-2 Tablets by mouth every four hours as needed for up to 7 days.  Dose: 5-10 mg     rivaroxaban 10 MG Tabs tablet  Commonly known as: Xarelto   Take 1 tablet by mouth with dinner.  Dose: 10 mg        CHANGE how you take these medications      Instructions   * traMADol 50 MG Tabs  What changed: Another medication with the same name was added. Make sure you understand how and when to take each.  Commonly known as: ULTRAM   Take 50 mg by mouth every four hours as needed.  Dose: 50 mg     * traMADol 50 MG Tabs  What changed: You were already taking a medication with the same name, and this prescription was added. Make sure you understand how and when to take each.  Commonly known as: ULTRAM   Take 1-2 Tablets by mouth every 6 hours as needed for up to 10 days.  Dose:  mg         * This list has 2 medication(s) that are the same as other medications prescribed for you. Read the directions carefully, and ask your doctor or other care provider to review them with you.            CONTINUE taking these medications      Instructions   * acetaminophen 500 MG Tabs  Commonly known as: TYLENOL   Take 1,000 mg by mouth 2 times a day as needed for Mild Pain.  Dose: 1,000 mg     * Acetaminophen 500 MG Caps   TYLENOL CAPS     B-12 PO   Take 1 tablet by mouth every morning.  Dose: 1 tablet      carboxymethylcellulose 0.5 % Soln  Commonly known as: REFRESH TEARS   Administer 1 Drop into affected eye(s).  Dose: 1 Drop     folic acid 1 MG Tabs  Commonly known as: FOLVITE   Take 400 mcg by mouth every day.  Dose: 400 mcg     JUICE PLUS FIBRE PO    Take  by mouth.     METFORMIN HCL PO   Take  by mouth.     NON SPECIFIED   Transfer Factor Plus Supplement     Omega 3 1000 MG Caps   Take 1,000 mg by mouth every day.  Dose: 1,000 mg     OneTouch Delica Plus [id number] Misc   USE TO TEST UP TO 6 TIMES PER DAY AS NEEDED FOR SYMPTOMS OF HIGH OR LOW SUGAR     OneTouch Ultra strip  Generic drug: glucose blood   USE TO TEST UP TO SIX TIMES A DAY AS NEEDED FOR SYMPTOMS OF HIGH OR LOW SUGARS     REFRESH DRY EYE THERAPY OP   by Ophthalmic route.     Toujeo SoloStar 300 UNIT/ML Sopn  Generic drug: Insulin Glargine (1 Unit Dial)   Inject 10 Units as instructed every day.  Dose: 10 Units     Vitamin D3 2000 UNIT Caps   Take  by mouth. 10,000 units every other day alternating with 5000 units every other day         * This list has 2 medication(s) that are the same as other medications prescribed for you. Read the directions carefully, and ask your doctor or other care provider to review them with you.                Patient will be discharged home and follow up with Dr. Dotson in 2 weeks, for which the patient already has scheduled. Clinic phone number is [phone].    Instructions:  -Leave the bandage in place until your followup appointment in 2 weeks.  -Call if there is drainage beneath the bandage  -Use ice frequently to help with pain and swelling control  -Put as much weight as comfortable on the operative leg.  Use a walker to assist with balance.  -Take your blood clot prevention medication for 4 weeks after surgery.

## 2021-06-16 NOTE — ANESTHESIA POSTPROCEDURE EVALUATION
Patient: Sydney Carlin    Procedure Summary     Date: 06/15/21 Room / Location:  OR 04 / SURGERY [facility]    Anesthesia Start: 1157 Anesthesia Stop: 1326    Procedure: ARTHROPLASTY, HIP, TOTAL, ANTERIOR APPROACH. (Left Hip) Diagnosis: (PRIMARY LOCALIZED OSTEOARTHRITIS, PAIN OF LEFT HIP)    Surgeons: Steve Dotson M.D. Responsible Provider: Beltran Gipson M.D.    Anesthesia Type: general ASA Status: 3          Final Anesthesia Type: general  Last vitals  BP   Blood Pressure: 137/73    Temp   36.8 °C (98.2 °F)    Pulse   82   Resp   18    SpO2   93 %      Anesthesia Post Evaluation    Patient location during evaluation: PACU  Patient participation: complete - patient participated  Level of consciousness: awake and alert  Pain score: 1    Airway patency: patent  Anesthetic complications: no  Cardiovascular status: hemodynamically stable  Respiratory status: acceptable  Hydration status: euvolemic    PONV: none          No complications documented.     Nurse Pain Score: 1 (NPRS)

## 2021-06-16 NOTE — THERAPY
Physical Therapy   Initial Evaluation     Patient Name: Sydney Carlin  Age:  60 y.o., Sex:  female  Medical Record #: [id number]  Today's Date: 6/16/2021     Precautions: (P) Anterior Hip Precautions, Weight Bearing As Tolerated Left Lower Extremity    Assessment  Patient is 60 y.o. female s/p L SINA anterior approach.  Other PMH includes history of metastatic cancer and arthritis. Pt reports she lives alone and is independent with self care and mobility at baseline. She lives in a single level condo with 2 platform steps to enter. Today, pt demonstrated fair perform with bed mobility, transfers, gait, stairs with use FWW. She will have support from her mom for a week when she returns home, and is scheduled to start outpatient PT. Performed hip exercises with patient. Pt with no further need for skilled PT in the acute setting at this time. Recommend further PT in the outpatient setting.    Plan    Recommend Physical Therapy for Evaluation only    DC Equipment Recommendations: (P) None (DME needs met at home)  Discharge Recommendations: (P) Recommend outpatient physical therapy services to address higher level deficits       Subjective    Pt stating she is feeling much better today than yesterday.     Objective       06/16/21 1303   Prior Living Situation   Prior Services Home-Independent;None   Housing / Facility 1 Story Apartment / Condo   Steps Into Home 1   Steps In Home 0   Bathroom Set up Walk In Shower;Shower Chair   Equipment Owned Front-Wheel Walker  (walking stick)   Lives with - Patient's Self Care Capacity Alone and Able to Care For Self   Comments pt reports independence with self care and mobility at baseline; she will have help from her mom for a week following surgery   Prior Level of Functional Mobility   Bed Mobility Independent   Transfer Status Independent   Ambulation Independent   Assistive Devices Used Front-Wheel Walker   Stairs Independent   History of Falls   History of Falls No    Cognition    Cognition / Consciousness WDL   Level of Consciousness Alert   Comments pleasant and cooperative, engaged   Strength Lower Body   Comments limited L LE consistent with L SINA   Sensation Lower Body   Comments neuropathy bilat LEs at baseline   Balance Assessment   Sitting Balance (Static) Good   Sitting Balance (Dynamic) Good   Standing Balance (Static) Fair   Standing Balance (Dynamic) Fair   Weight Shift Sitting Fair   Weight Shift Standing Fair   Comments with FWW   Gait Analysis   Gait Level Of Assist Supervised   Assistive Device Front Wheel Walker   Distance (Feet) 150   # of Times Distance was Traveled 2   Deviation Antalgic;Step To;Decreased Heel Strike;Decreased Toe Off   # of Stairs Climbed 2   Level of Assist with Stairs   (SBA)   Weight Bearing Status WBAT   Comments no LOB, cues for upright posture, even step length   Bed Mobility    Supine to Sit Supervised   Sit to Supine Minimal Assist  (LLE)   Scooting Supervised   Comments with flat bed   Functional Mobility   Sit to Stand Supervised   Bed, Chair, Wheelchair Transfer Supervised   Transfer Method Stand Step   Mobility supine->sit->stand->amb->stairs->sit->amb->sit EOB->supine   Comments tolerated well   How much difficulty does the patient currently have...   Turning over in bed (including adjusting bedclothes, sheets and blankets)? 3   Sitting down on and standing up from a chair with arms (e.g., wheelchair, bedside commode, etc.) 3   Moving from lying on back to sitting on the side of the bed? 3   How much help from another person does the patient currently need...   Moving to and from a bed to a chair (including a wheelchair)? 4   Need to walk in a hospital room? 4   Climbing 3-5 steps with a railing? 3   6 clicks Mobility Score 20   Activity Tolerance   Sitting in Chair 3 minutes   Sitting Edge of Bed 2x3 minutes approx   Standing 2x6 mintues approx   Comments limited by LE fatigue and hip pain   Problem List    Problems  Pain;Impaired Ambulation;Functional Strength Deficit;Decreased Activity Tolerance   Anticipated Discharge Equipment and Recommendations   DC Equipment Recommendations None  (DME needs met at home)   Discharge Recommendations Recommend outpatient physical therapy services to address higher level deficits

## 2021-06-16 NOTE — DISCHARGE INSTRUCTIONS
Patient will be discharged home and follow up with Dr. Dotson in 2 weeks, for which the patient already has scheduled. Clinic phone number is [phone].     Instructions:  -Leave the bandage in place until your followup appointment in 2 weeks.  -Call if there is drainage beneath the bandage  -Use ice frequently to help with pain and swelling control  -Put as much weight as comfortable on the operative leg.  Use a walker to assist with balance.  -Take your blood clot prevention medication for 4 weeks after surgery.     Discharge Instructions    Discharged to home by car with relative. Discharged via wheelchair, hospital escort: Yes.  Special equipment needed: Not Applicable    Be sure to schedule a follow-up appointment with your primary care doctor or any specialists as instructed.     Discharge Plan:   Diet Plan: Discussed  Activity Level: Discussed  Confirmed Follow up Appointment: Patient to Call and Schedule Appointment  Confirmed Symptoms Management: Discussed  Medication Reconciliation Updated: Yes    I understand that a diet low in cholesterol, fat, and sodium is recommended for good health. Unless I have been given specific instructions below for another diet, I accept this instruction as my diet prescription.   Other diet: Regular    Special Instructions: Discharge instructions for the Orthopedic Patient    Follow up with Primary Care Physician within 2 weeks of discharge to home, regarding:  Review of medications and diagnostic testing.  Surveillance for medical complications.  Workup and treatment of osteoporosis, if appropriate.     -Is this a Hip/Knee/Shoulder Joint Replacement patient? Yes   TOTAL HIP REPLACEMENT, AFTER-CARE GUIDELINES     These instructions provide you with information on caring for yourself and your hip after surgery. Your health care provider may also give you instructions that are more specific. Your treatment was planned and performed according to current medical practices but  problems sometimes occur. Call your health care provider if you have any problems or questions.     WHAT TO EXPECT AFTER THE PROCEDURE   After your procedure, your hip will typically be stiff, sore, and bruised. This will improve over time.     Pain   · Follow your home pain management plan as discussed with your nurse and as directed by your provider.   · It is important to follow any scheduled pain medications for maximal pain relief.   · If prescribed opioid medication, the goal is to use opioids only as needed and to wean off prescription pain medicine as soon as possible.   · Ice use for pain control.  · Put ice in a plastic bag.   · Place a towel between your skin and the bag.   · Leave the ice on for 20 minutes, 2-3 times a day at a minimum.   · Most patients are off the pain pills by 3 weeks. If your pain continues to be severe, follow up with your provider.     Infection   Deep hip joint infections that require removal of the prostheses occur in less than 1.0% of patients. Lesser infections in the skin (cellulites) are more common and much more easily treated.   · Keep the incision as clean and dry as possible.   · Always wash your hands before touching your incision.   · Avoid dental care for 3 months after surgery. Your provider may recommend taking a dose of antibiotics an hour prior to any dental procedure. After 2 years, most providers recommend antibiotics only before an extensive procedure. Ask your provider what they recommend.   · Signs and symptoms of infection include low-grade fever, redness, pain, swelling and drainage from your incision. Notify your provider IMMEDIATELY if you develop ANY of these symptoms.     Post op Disturbances   · Bowel Habits - constipation is extremely common and caused by a combination of anesthesia, lack of mobility, dehydration and pain medicine. Use stool softeners or laxatives if necessary. It is important not to ignore this problem as bowel obstructions can be a  serious complication after joint replacement surgery.   · Mood/Energy Level - Many patients experience a lack of energy and endurance for up to 2-3 months after surgery. Some people feel down and can even become depressed. This is likely due to postoperative anemia, change in activity level, lack of sleep, pain medicine and just the emotional reaction to the surgery itself that is a big disruption in a person’s life. This usually passes. If symptoms persist, follow up with your primary care provider.   · Returning to Work - Your provider will give you specific instructions based on your profession. Generally, if you work a sedentary job requiring little standing or walking, most patients may return within 2-6 weeks. Manual labor jobs involving walking, lifting and standing may take 3-4 months. Your provider’s office can provide a release to part-time or light duty work early on in your recovery and progress you to full duty as able.   · Driving - You can begin driving once cleared by your provider, provided you are no longer taking narcotic pain medication or any other medications that impair driving. Discuss the length of time with your doctor as returning to driving will depend on things such as your vehicle, which hip was replaced (right or left) and if you do or do not have post-operative movement precautions.   · Avoiding falls - A fall during the first few weeks after surgery can damage your new hip and may result in a need for further surgery.  throw rugs and tack down loose carpeting. Be aware of floor hazards such as pets, small objects or uneven surfaces. Notify your provider of any falls.   · Airport Metal Detectors - The sensitivity of metal detectors varies and it is likely that your prosthesis will cause an alarm. Inform the  of your artificial joint.     Diet   · Resume your normal diet as tolerated.   · It is important to achieve a healthy nutritional status by eating a  well-balanced diet on a regular basis.   · Your provider may recommend that you take iron and vitamin supplements.   · Continue to drink plenty of fluids.     Shower/Bathing   · You may shower as soon as you get home from the hospital unless otherwise instructed.   · Keep your incision out of water to prevent infection. To keep the incision dry when showering, cover it with a plastic bag or plastic wrap. If your bandage is waterproof, this may not be necessary.   · Pat incision dry if it gets wet. Do not rub. Notify your provider.   · Do not submerge in a bath until cleared by your provider. Your staples must be out and the incision completely healed.     Dressing Changes: Only change your dressing if directed by your provider.   · Wash hands.   · Open all dressing change materials.   · Remove old dressing and discard.   · Inspect incision for signs of irritation or infection including redness, increase in clear drainage, yellow/green drainage, odor and surrounding skin hot to touch. Notify your provider if present.   ·  the new dressing by one corner and lay over the incision. Be careful not to touch the inside of the dressing that will lay over the incision.   · Secure in place as instructed.     Swelling/Bruising   · Swelling is normal after hip replacement and can involve the thigh, knee, calf and foot.   · Swelling can last from 3-6 months.   · To reduce swelling, elevate your leg higher than your heart while reclining. The first week you are home you should elevate your leg an equal amount of time as you are active.   · The swelling is usually worse after you go home since you are upright for longer periods of time.   · Bruising often does not appear until after you arrive home and can be quite dramatic- appearing purple, black, or green. Bruising is typically not concerning and will subside without any treatment.     Blood Clot Prevention   Your treatment plan includes multiple preventative measures to  decrease the risk of blood clots in the legs (DVTs) and the less common, but serious, clots that travel to the lungs (pulmonary emboli). Most patients are at standard risk for them, but people who are at higher risk include those who have had previous clots, a family history of clotting, smoking, diabetes, obesity, advanced age, use estrogen and/or live a sedentary lifestyle.     · Signs of blood clots in legs include - Swelling in thigh, calf or ankle that does not go down with elevation. Pain, heat and tenderness in calf, back of calf or groin area. NOTE: blood clots can occur in either leg.   · Signs of blood clots in lungs include - Sudden increased shortness of breath, sudden onset of chest pain, and localized chest pain with coughing.   · If you experience any of the above symptoms, notify your provider and seek medical attention immediately.   · You received anticoagulant therapy (blood thinners) in the hospital. Continue the prescribed blood-thinning medication at home, as directed by your provider.   · Your risk for developing a clot continues for up to 2-3 months after surgery. Avoid prolonged sitting and dehydration (long air trips and car trips). If you do take a trip during this time, please get up, move around every 1-1.5 hours, and discuss all travel plans with your provider.     Activity   Once you get home, stay active. The key is not to overdo it. While you can expect some good days and some bad days, you should notice a gradual improvement over time and a gradual increase in endurance over the next 6 to 12 months.     · Weight Bearing - You can begin to bear weight as tolerated unless otherwise directed by your provider. Use a walker, crutches or cane to assist with walking until you can walk smoothly (minimal or no limp) without assistance.   · Physical Precautions - To assure proper recovery and tissue healing, you may be asked to take special precautions when moving (sitting, bending or  sleeping) - usually for the first 6 weeks after surgery. Precautions vary from patient to patient depending on surgical approach used by your provider. Follow any specific precautions provided by your provider and physical therapist until cleared by your provider.   · Sitting - Early on it is easier to get up from a tall chair or a chair with arms. The physical therapist will show you how to sit and stand from a chair keeping your affected leg out in front of you. Get up and move around on a regular basis--at least once every hour.   · Walking - Walk as much as you like once your doctor gives permission to proceed, but remember that walking is no substitute for your prescribed exercises.  · Follow the home exercise program prescribed during your hospital stay as directed by your physical therapist or provider.   · Continued physical therapy may be prescribed after hospital discharge to strengthen your muscles and improve your gait/walking pattern. The decision to have therapy or not after the hospital stay is made by you and your provider prior to surgery or at your follow up appointment.   · Swimming is an excellent low impact activity; you can begin as soon as the wound is healed and your provider clears you. Using a pair of training fins may make swimming a more enjoyable and effective exercise.   · Sexual activity - Your provider can tell you when it is safe to resume sexual activity.   · Other activities - Low impact activities are preferred. If you have specific questions, consult your provider.     When to Call the Doctor   Call the provider if you experience:   · Fever over 100.5° F   · Increased pain, drainage, redness, odor or heat around the incision area   · Shaking chills   · Increased knee pain with activity and rest   · Increased pain in calf, tenderness or redness above or below the knee   · Increased swelling of calf, ankle, foot   · Sudden increased shortness of breath, sudden onset of chest pain,  localized chest pain with coughing   · Incision opening   Or, if there are any questions or concerns about medications or care.     Infection statistic resource:   [email or web address]-epidemiology-microbiology-[email or web address]-[email or web address]-diagnosis     -Is this patient being discharged with medication to prevent blood clots?  Yes, Xarelto Rivaroxaban oral tablets  What is this medicine?  RIVAROXABAN (ri va CAITLYN a ban) is an anticoagulant (blood thinner). It is used to treat blood clots in the lungs or in the veins. It is also used to prevent blood clots in the lungs or in the veins. It is also used to lower the chance of stroke in people with a medical condition called atrial fibrillation.  This medicine may be used for other purposes; ask your health care provider or pharmacist if you have questions.  COMMON BRAND NAME(S): Xarelto, Xarelto Starter Pack  What should I tell my health care provider before I take this medicine?  They need to know if you have any of these conditions:  · antiphospholipid antibody syndrome  · artificial heart valve  · bleeding disorders  · bleeding in the brain  · blood in your stools (black or tarry stools) or if you have blood in your vomit  · history of blood clots  · history of stomach bleeding  · kidney disease  · liver disease  · low blood counts, like low white cell, platelet, or red cell counts  · recent or planned spinal or epidural procedure  · take medicines that treat or prevent blood clots  · an unusual or allergic reaction to rivaroxaban, other medicines, foods, dyes, or preservatives  · pregnant or trying to get pregnant  · breast-feeding  How should I use this medicine?  Take this medicine by mouth with a glass of water. Follow the directions on the prescription label. Take your medicine at regular intervals. Do not take it more often than directed. Do not stop taking except on your doctor's advice. Stopping this medicine may increase  your risk of a blood clot. Be sure to refill your prescription before you run out of medicine.  If you are taking this medicine after hip or knee replacement surgery, take it with or without food. If you are taking this medicine for atrial fibrillation, take it with your evening meal. If you are taking this medicine to treat blood clots, take it with food at the same time each day. If you are unable to swallow your tablet, you may crush the tablet and mix it in applesauce. Then, immediately eat the applesauce. You should eat more food right after you eat the applesauce containing the crushed tablet.  Talk to your pediatrician regarding the use of this medicine in children. Special care may be needed.  Overdosage: If you think you have taken too much of this medicine contact a poison control center or emergency room at once.  NOTE: This medicine is only for you. Do not share this medicine with others.  What if I miss a dose?  If you take your medicine once a day and miss a dose, take the missed dose as soon as you remember. If it is almost time for your next dose, take only that dose. Do not take double or extra doses.  If you take your medicine twice a day and miss a dose, take the missed dose immediately. In this instance, 2 tablets may be taken at the same time. The next day you should take 1 tablet twice a day as directed.  What may interact with this medicine?  Do not take this medicine with any of the following medications:  · defibrotide  This medicine may also interact with the following medications:  · aspirin and aspirin-like medicines  · certain antibiotics like erythromycin, azithromycin, and clarithromycin  · certain medicines for fungal infections like ketoconazole and itraconazole  · certain medicines for irregular heart beat like amiodarone, quinidine, dronedarone  · certain medicines for seizures like carbamazepine, phenytoin  · certain medicines that treat or prevent blood clots like warfarin,  enoxaparin, and dalteparin  · conivaptan  · felodipine  · indinavir  · lopinavir; ritonavir  · NSAIDS, medicines for pain and inflammation, like ibuprofen or naproxen  · ranolazine  · rifampin  · ritonavir  · SNRIs, medicines for depression, like desvenlafaxine, duloxetine, levomilnacipran, venlafaxine  · SSRIs, medicines for depression, like citalopram, escitalopram, fluoxetine, fluvoxamine, paroxetine, sertraline  · Erika's wort  · verapamil  This list may not describe all possible interactions. Give your health care provider a list of all the medicines, herbs, non-prescription drugs, or dietary supplements you use. Also tell them if you smoke, drink alcohol, or use illegal drugs. Some items may interact with your medicine.  What should I watch for while using this medicine?  Visit your healthcare professional for regular checks on your progress. You may need blood work done while you are taking this medicine. Your condition will be monitored carefully while you are receiving this medicine. It is important not to miss any appointments.  Avoid sports and activities that might cause injury while you are using this medicine. Severe falls or injuries can cause unseen bleeding. Be careful when using sharp tools or knives. Consider using an electric razor. Take special care brushing or flossing your teeth. Report any injuries, bruising, or red spots on the skin to your healthcare professional.  If you are going to need surgery or other procedure, tell your healthcare professional that you are taking this medicine.  Wear a medical ID bracelet or chain. Carry a card that describes your disease and details of your medicine and dosage times.  What side effects may I notice from receiving this medicine?  Side effects that you should report to your doctor or health care professional as soon as possible:  · allergic reactions like skin rash, itching or hives, swelling of the face, lips, or tongue  · back pain  · redness,  blistering, peeling or loosening of the skin, including inside the mouth  · signs and symptoms of bleeding such as bloody or black, tarry stools; red or dark-brown urine; spitting up blood or brown material that looks like coffee grounds; red spots on the skin; unusual bruising or bleeding from the eye, gums, or nose  · signs and symptoms of a blood clot such as chest pain; shortness of breath; pain, swelling, or warmth in the leg  · signs and symptoms of a stroke such as changes in vision; confusion; trouble speaking or understanding; severe headaches; sudden numbness or weakness of the face, arm or leg; trouble walking; dizziness; loss of coordination  Side effects that usually do not require medical attention (report to your doctor or health care professional if they continue or are bothersome):  · dizziness  · muscle pain  This list may not describe all possible side effects. Call your doctor for medical advice about side effects. You may report side effects to FDA at 3-028-WFX-8522.  Where should I keep my medicine?  Keep out of the reach of children.  Store at room temperature between 15 and 30 degrees C (59 and 86 degrees F). Throw away any unused medicine after the expiration date.  NOTE: This sheet is a summary. It may not cover all possible information. If you have questions about this medicine, talk to your doctor, pharmacist, or health care provider.  © 2020 [employer] (2020-03-16 09:45:59)      · Is patient discharged on Warfarin / Coumadin?   No     Depression / Suicide Risk    As you are discharged from this [facility] facility, it is important to learn how to keep safe from harming yourself.    Recognize the warning signs:  · Abrupt changes in personality, positive or negative- including increase in energy   · Giving away possessions  · Change in eating patterns- significant weight changes-  positive or negative  · Change in sleeping patterns- unable to sleep or sleeping all the  time   · Unwillingness or inability to communicate  · Depression  · Unusual sadness, discouragement and loneliness  · Talk of wanting to die  · Neglect of personal appearance   · Rebelliousness- reckless behavior  · Withdrawal from people/activities they love  · Confusion- inability to concentrate     If you or a loved one observes any of these behaviors or has concerns about self-harm, here's what you can do:  · Talk about it- your feelings and reasons for harming yourself  · Remove any means that you might use to hurt yourself (examples: pills, rope, extension cords, firearm)  · Get professional help from the community (Mental Health, Substance Abuse, psychological counseling)  · Do not be alone:Call your Safe Contact- someone whom you trust who will be there for you.  · Call your local CRISIS HOTLINE 923-3773 or 535-843-8215  · Call your local Children's Mobile Crisis Response Team [place] (966) 987-6579 or www.[employer]  · Call the toll free National Suicide Prevention Hotlines   · National Suicide Prevention Lifeline 446-038-TSCF (7633)  · National Hope Line Network 800-SUICIDE (580-6259)

## 2021-06-16 NOTE — PROGRESS NOTES
Received report from Day shift RN. Pt is laying in bed, A+OX4 , showing no signs of distress. VSS. Polar ice in place, scd's on.  Pt c/o numbness and weakness to her LLE. Pt educated to call before getting out of bed. Commode placed bedside. Pt up to the commode with x2 assist and c/o weakness and her knee buckling. Pt re-educated on POC. Fall precautions in place. Call light and belongings at bedside and within reach. All questions answered at this time. Rounding in place

## 2021-06-16 NOTE — CARE PLAN
"The patient is Stable - Low risk of patient condition declining or worsening    Shift Goals  Clinical Goals: ambulate and void   Patient Goals: pain control     Progress made toward(s) clinical / shift goals: Pts pain is at a tolerable level, per pt. Pt is able to void into bedside commode     Patient is not progressing towards the following goals: Pt c/o her knee \"buckling\" each time she stands. Pt is unable to ambulate the halls at this time due to safety risk. SCD's on.         Problem: Pain - Standard  Goal: Alleviation of pain or a reduction in pain to the patient’s comfort goal  Outcome: Progressing     Problem: Fall Risk  Goal: Patient will remain free from falls  Outcome: Progressing     Problem: Knowledge Deficit - Standard  Goal: Patient and family/care givers will demonstrate understanding of plan of care, disease process/condition, diagnostic tests and medications  Outcome: Progressing       "

## 2021-06-16 NOTE — PROGRESS NOTES
Assumed care of pt. A&O x4, pt reports 1/10 pain to L hip due to sitting at edge of bed for breakfast. No signs of distress. Pt able to plantar/dorsiflex. Pedal pulses present. CMS intact. Dressing CDI. Discussed POC. No signs of distress.

## 2021-06-16 NOTE — DISCHARGE PLANNING
Anticipated Discharge Disposition: Home     Action: Note in Epic placed by RNDanette CONDE states that during preadmission it was identified that pt has all of the needed equipment upon d/c. Pt stated to have a front-wheel walker, raised toilet seat and shower chair available upon d/c.     Pt is pending PT/OT at this time.     Barriers to Discharge: None     Plan: LSW to assist as needed

## 2021-06-16 NOTE — PROGRESS NOTES
4 Eyes Skin Assessment Completed by Chanda , RN and Timothy, RN.    Head WDL  Ears WDL  Nose WDL  Mouth WDL  Neck WDL  Breast/Chest WDL  Shoulder Blades WDL  Spine Incision old scar  (R) Arm/Elbow/Hand WDL  (L) Arm/Elbow/Hand WDL  Abdomen WDL  Groin WDL  Scrotum/Coccyx/Buttocks WDL  (R) Leg WDL  (L) Leg Scar and Incision  (R) Heel/Foot/Toe WDL  (L) Heel/Foot/Toe WDL          Devices In Places Pulse Ox and SCD's       Interventions In Place Pillows    Possible Skin Injury No    Pictures Uploaded Into Epic N/A  Wound Consult Placed N/A  RN Wound Prevention Protocol Ordered No

## 2021-06-16 NOTE — DIETARY
NUTRITION SERVICES: BMI - Pt with BMI >40 (=Body mass index is 43.58 kg/m².), Class III obesity. Weight loss counseling not appropriate in acute care setting. RECOMMEND - If appropriate at DC please refer to outpatient nutrition services for weight management.

## 2021-06-16 NOTE — PROGRESS NOTES
" Subjective:    No overnight events.  Mobilizing well.  Pain reasonably well controlled.    Objective:  /73   Pulse 82   Temp 36.8 °C (98.2 °F) (Temporal)   Resp 18   Ht 1.727 m (5' 8\")   Wt (!) 130 kg (286 lb 9.6 oz)   SpO2 93%                 Intake/Output Summary (Last 24 hours) at 6/16/2021 0712  Last data filed at 6/16/2021 0500  Gross per 24 hour   Intake 1780 ml   Output 1625 ml   Net 155 ml       Comfortable, no distress  Neurologically and vascularly intact with palpable pedal pulses bilaterally.  Dressing C/D/I    Assessment:    Doing well s/p total hip arthroplasty.    Plan:    Diet as tolerated  WBAT  OT/PT  Pain control/Ice   DVT ppx - Xarelto + Sequential Compression Devices  Plan to discharge home  Follow-up approximately 2 weeks post-op. [phone]    " 1 person assist

## 2021-06-16 NOTE — CARE PLAN
Problem: Post-Operative Hip Replacement  Goal: Patient will demonstrate understanding of post-surgical hip precautions, weight bearing restrictions and DME usage during hospital stay and post discharge  Outcome: Progressing  Goal: Patient's neurovascular status will be maintained or improve  Outcome: Progressing  Goal: Early mobilization post surgery  Outcome: Progressing     Problem: Pain - Post Surgery  Goal: Alleviation or reduction of pain post surgery  Outcome: Progressing     Problem: Respiratory/Oxygenation Function Post-Surgical  Goal: Patient will achieve/maintain normal respiratory rate/effort  Outcome: Progressing   The patient is Stable - Low risk of patient condition declining or worsening    Shift Goals  Clinical Goals: work with PT/OT  Patient Goals: pain control    Progress made toward(s) clinical / shift goals:  Pain assessed, administered medications, pt worked with PT/OT. Pt given discharge education.    Patient is not progressing towards the following goals:

## 2021-06-16 NOTE — THERAPY
"Occupational Therapy   Initial Evaluation     Patient Name: Sydney Carlin  Age:  60 y.o., Sex:  female  Medical Record #: [id number]  Today's Date: 6/16/2021     Precautions  Precautions: Anterior Hip Precautions, No Weight Bearing Restrictions Left Lower Extremity    Assessment  Patient is 60 y.o. female admitted to the hospital for left SINA. 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 Pt reports that she is currently free from mets after treatment. Pt normally lives alone and is fully independent. Pt with have her mother stay with her for one week to assist as needed. Pt presents to OT with limitations due to pain. Pt required frequent repetition for safety during functional transfers. By the end of the session, pt was able to incorporate the education and demonstrated safety with transfers. Pt educated on use of AE for LB dressing and plans to obtain a reacher. Equipment list provided. Pt with no further skilled OT needs at this time.     Plan    Recommend Occupational Therapy for Evaluation only     DC Equipment Recommendations:  (reacher)  Discharge Recommendations: Anticipate that the patient will have no further occupational therapy needs after discharge from the hospital     Subjective    \"I was in so much pain last night.\"     Objective       06/16/21 0920   Prior Living Situation   Prior Services Home-Independent   Housing / Facility 1 Story House   Bathroom Set up Walk In Shower;Shower Chair  (has a hand held shower head but needs to get it installed)   Equipment Owned Front-Wheel Walker  (leg  )   Lives with - Patient's Self Care Capacity Alone and Able to Care For Self   Comments Pt's mom will come and staty with her for one week.    Prior Level of ADL Function   Self Feeding Independent   Grooming / Hygiene Independent   Bathing Independent   Dressing Independent   Toileting Independent   Prior Level of IADL Function   Medication Management Independent   Laundry Independent   Kitchen " Mobility Independent   Home Management Independent   Shopping Independent   Prior Level Of Mobility Independent With Device in Home  (limited by hip pain )   Driving / Transportation Driving Independent   Precautions   Precautions Anterior Hip Precautions;No Weight Bearing Restrictions Left Lower Extremity   Pain 0 - 10 Group   Therapist Pain Assessment During Activity;Nurse Notified  (pt reports pain with weight bearing through LLE)   Cognition    Cognition / Consciousness WDL   Level of Consciousness Alert   Comments pleasant and cooperative    Active ROM Upper Body   Active ROM Upper Body  WDL   Strength Upper Body   Upper Body Strength  WDL   Sensation Upper Body   Comments 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    Upper Body Muscle Tone   Upper Body Muscle Tone  WDL   Coordination Upper Body   Coordination WDL   Balance Assessment   Sitting Balance (Static) Good   Sitting Balance (Dynamic) Good   Standing Balance (Static) Fair   Standing Balance (Dynamic) Fair   Weight Shift Sitting Fair   Weight Shift Standing Fair   Comments standing with FWW   Bed Mobility    Supine to Sit Supervised   Sit to Supine Minimal Assist  (assist for LLE, able to perform w/ spvn w/ leg  )   Scooting Supervised   ADL Assessment   Grooming Supervision;Standing   Upper Body Dressing Supervision  (bra and tank top )   Lower Body Dressing Maximal Assist  (MaxA without AE, supervision with AE)   Toileting Supervision   Comments Pt instructed in use of LB dressing AE. Pt return demonstrated understanding and was able to complete LB dressing with supervision and use of AE   How much help from another person does the patient currently need...   Putting on and taking off regular lower body clothing? 3   Bathing (including washing, rinsing, and drying)? 3   Toileting, which includes using a toilet, bedpan, or urinal? 3   Putting on and taking off regular upper body clothing? 4   Taking care of personal grooming such as brushing  teeth? 4   Eating meals? 4   6 Clicks Daily Activity Score 21   Functional Mobility   Sit to Stand Supervised  (from bed )   Toilet Transfers Moderate Assist  (to stand up from toilet, improved to spvn w/ practice )   Mobility walked to/from bathroom with FWW and supervision, no LOB noted    Comments During sit to stand and stand to sit transitions, pt required frequent verbal cues to put her left foot slightly forward to minimize pain and to square herself completely to the surface. She tends to plant her foot and then try to pivot to the seating surface. Toward the end of the session, the patient was able to catch herself before she did this and demonstrated increased safety with transfers    Patient / Family Goals   Patient / Family Goal #1 to go home    Short Term Goals   Short Term Goal # 1 Pt will complete LB dressing with supervision and AE    Goal Outcome # 1 Goal met   Education Group   Education Provided Role of Occupational Therapist;Activities of Daily Living;Transfers;Home Safety;Adaptive Equipment;Hip Precautions   Role of Occupational Therapist Patient Response Patient;Acceptance;Explanation;Verbal Demonstration   Hip Precautions Patient Response Patient;Acceptance;Explanation;Demonstration;Verbal Demonstration;Action Demonstration;Reinforcement Needed   Home Safety Patient Response Patient;Acceptance;Explanation;Verbal Demonstration   Transfers Patient Response Patient;Acceptance;Explanation;Demonstration;Verbal Demonstration;Action Demonstration   ADL Patient Response Patient;Acceptance;Explanation;Demonstration;Verbal Demonstration;Action Demonstration   Adaptive Equipment Patient Response Patient;Acceptance;Explanation;Demonstration;Verbal Demonstration;Action Demonstration

## 2021-06-18 ENCOUNTER — NURSE TRIAGE (OUTPATIENT)
Dept: HEALTH INFORMATION MANAGEMENT | Facility: OTHER | Age: 61
End: 2021-06-18

## 2021-06-18 NOTE — TELEPHONE ENCOUNTER
"Pt calling to clarifying pain medication.    Advised pt on current prescribed Oxycodone and Tramadol frequency.      Reason for Disposition  • Caller has medication question only, adult not sick, and triager answers question    Additional Information  • Negative: Drug overdose and nurse unable to answer question  • Negative: Caller requesting information not related to medicine  • Negative: Caller requesting a prescription for Strep throat and has a positive culture result  • Negative: Rash while taking a medication or within 3 days of stopping it  • Negative: Immunization reaction suspected  • Negative: [1] Asthma and [2] having symptoms of asthma (cough, wheezing, etc)  • Negative: MORE THAN A DOUBLE DOSE of a prescription or over-the-counter (OTC) drug  • Negative: [1] DOUBLE DOSE (an extra dose or lesser amount) of over-the-counter (OTC) drug AND [2] any symptoms (e.g., dizziness, nausea, pain, sleepiness)  • Negative: [1] DOUBLE DOSE (an extra dose or lesser amount) of prescription drug AND [2] any symptoms (e.g., dizziness, nausea, pain, sleepiness)  • Negative: Took another person's prescription drug  • Negative: [1] DOUBLE DOSE (an extra dose or lesser amount) of prescription drug AND [2] NO symptoms (Exception: a double dose of antibiotics)  • Negative: Diabetes drug error or overdose (e.g., insulin or extra dose)  • Negative: [1] Request for URGENT new prescription or refill of \"essential\" medication (i.e., likelihood of harm to patient if not taken) AND [2] triager unable to fill per unit policy  • Negative: [1] Prescription not at pharmacy AND [2] was prescribed today by PCP  • Negative: Pharmacy calling with prescription questions and triager unable to answer question  • Negative: Caller has URGENT medication question about med that PCP prescribed and triager unable to answer question  • Negative: Caller has NON-URGENT medication question about med that PCP prescribed and triager unable to answer " "question  • Negative: Caller requesting a NON-URGENT new prescription or refill and triager unable to refill per unit policy  • Negative: Caller has medication question about med not prescribed by PCP and triager unable to answer question (e.g., compatibility with other med, storage)  • Negative: [1] DOUBLE DOSE (an extra dose or lesser amount) of over-the-counter (OTC) drug AND [2] NO symptoms  • Negative: [1] DOUBLE DOSE (an extra dose or lesser amount) of antibiotic drug AND [2] NO symptoms    Answer Assessment - Initial Assessment Questions  1. SYMPTOMS: \"Do you have any symptoms?\"      None  2. SEVERITY: If symptoms are present, ask \"Are they mild, moderate or severe?\"      None    Protocols used: MEDICATION QUESTION CALL-A-AH      "

## 2021-06-23 ENCOUNTER — APPOINTMENT (RX ONLY)
Dept: URBAN - METROPOLITAN AREA CLINIC 31 | Facility: CLINIC | Age: 61
Setting detail: DERMATOLOGY
End: 2021-06-23

## 2021-06-23 DIAGNOSIS — L72.0 EPIDERMAL CYST: ICD-10-CM

## 2021-06-23 DIAGNOSIS — L81.4 OTHER MELANIN HYPERPIGMENTATION: ICD-10-CM

## 2021-06-23 DIAGNOSIS — D18.0 HEMANGIOMA: ICD-10-CM

## 2021-06-23 DIAGNOSIS — D22 MELANOCYTIC NEVI: ICD-10-CM

## 2021-06-23 DIAGNOSIS — Z71.89 OTHER SPECIFIED COUNSELING: ICD-10-CM

## 2021-06-23 DIAGNOSIS — L82.1 OTHER SEBORRHEIC KERATOSIS: ICD-10-CM

## 2021-06-23 PROBLEM — D22.62 MELANOCYTIC NEVI OF LEFT UPPER LIMB, INCLUDING SHOULDER: Status: ACTIVE | Noted: 2021-06-23

## 2021-06-23 PROBLEM — D22.71 MELANOCYTIC NEVI OF RIGHT LOWER LIMB, INCLUDING HIP: Status: ACTIVE | Noted: 2021-06-23

## 2021-06-23 PROBLEM — D18.01 HEMANGIOMA OF SKIN AND SUBCUTANEOUS TISSUE: Status: ACTIVE | Noted: 2021-06-23

## 2021-06-23 PROBLEM — D22.72 MELANOCYTIC NEVI OF LEFT LOWER LIMB, INCLUDING HIP: Status: ACTIVE | Noted: 2021-06-23

## 2021-06-23 PROBLEM — D22.5 MELANOCYTIC NEVI OF TRUNK: Status: ACTIVE | Noted: 2021-06-23

## 2021-06-23 PROBLEM — D22.61 MELANOCYTIC NEVI OF RIGHT UPPER LIMB, INCLUDING SHOULDER: Status: ACTIVE | Noted: 2021-06-23

## 2021-06-23 PROBLEM — D23.72 OTHER BENIGN NEOPLASM OF SKIN OF LEFT LOWER LIMB, INCLUDING HIP: Status: ACTIVE | Noted: 2021-06-23

## 2021-06-23 PROCEDURE — 99396 PREV VISIT EST AGE 40-64: CPT

## 2021-06-23 PROCEDURE — ? COUNSELING

## 2021-06-23 ASSESSMENT — LOCATION SIMPLE DESCRIPTION DERM
LOCATION SIMPLE: RIGHT LOWER BACK
LOCATION SIMPLE: RIGHT THIGH
LOCATION SIMPLE: UPPER BACK
LOCATION SIMPLE: LEFT HAND
LOCATION SIMPLE: RIGHT UPPER ARM
LOCATION SIMPLE: RIGHT HAND
LOCATION SIMPLE: ABDOMEN
LOCATION SIMPLE: LEFT CHEEK
LOCATION SIMPLE: LEFT THIGH
LOCATION SIMPLE: LEFT UPPER ARM
LOCATION SIMPLE: LEFT SHOULDER
LOCATION SIMPLE: RIGHT FOREARM
LOCATION SIMPLE: RIGHT CHEEK
LOCATION SIMPLE: LEFT CALF
LOCATION SIMPLE: LEFT FOREARM
LOCATION SIMPLE: RIGHT CALF
LOCATION SIMPLE: RIGHT SHOULDER
LOCATION SIMPLE: RIGHT UPPER BACK

## 2021-06-23 ASSESSMENT — LOCATION DETAILED DESCRIPTION DERM
LOCATION DETAILED: PERIUMBILICAL SKIN
LOCATION DETAILED: RIGHT PROXIMAL DORSAL FOREARM
LOCATION DETAILED: INFERIOR THORACIC SPINE
LOCATION DETAILED: RIGHT PROXIMAL CALF
LOCATION DETAILED: RIGHT PROXIMAL POSTERIOR UPPER ARM
LOCATION DETAILED: LEFT PROXIMAL DORSAL FOREARM
LOCATION DETAILED: LEFT VENTRAL DISTAL FOREARM
LOCATION DETAILED: RIGHT SUPERIOR MEDIAL MIDBACK
LOCATION DETAILED: RIGHT INFERIOR MEDIAL UPPER BACK
LOCATION DETAILED: LEFT LATERAL MALAR CHEEK
LOCATION DETAILED: EPIGASTRIC SKIN
LOCATION DETAILED: LEFT INFERIOR MEDIAL MALAR CHEEK
LOCATION DETAILED: RIGHT VENTRAL DISTAL FOREARM
LOCATION DETAILED: LEFT ANTERIOR DISTAL THIGH
LOCATION DETAILED: RIGHT ULNAR DORSAL HAND
LOCATION DETAILED: RIGHT ANTERIOR DISTAL UPPER ARM
LOCATION DETAILED: LEFT POSTERIOR SHOULDER
LOCATION DETAILED: LEFT PROXIMAL CALF
LOCATION DETAILED: RIGHT ANTERIOR DISTAL THIGH
LOCATION DETAILED: LEFT PROXIMAL POSTERIOR UPPER ARM
LOCATION DETAILED: LEFT ANTERIOR DISTAL UPPER ARM
LOCATION DETAILED: RIGHT POSTERIOR SHOULDER
LOCATION DETAILED: RIGHT CENTRAL MALAR CHEEK
LOCATION DETAILED: LEFT RADIAL DORSAL HAND

## 2021-06-23 ASSESSMENT — LOCATION ZONE DERM
LOCATION ZONE: LEG
LOCATION ZONE: HAND
LOCATION ZONE: TRUNK
LOCATION ZONE: ARM
LOCATION ZONE: FACE

## 2021-06-23 NOTE — HPI: FULL BODY SKIN EXAMINATION
How Severe Are Your Spot(S)?: mild
What Type Of Note Output Would You Prefer (Optional)?: Standard Output
What Is The Reason For Today's Visit?: Full Body Skin Examination
What Is The Reason For Today's Visit? (Being Monitored For X): concerning skin lesions on an annual basis
Additional History: Wellness FBE Exam.

## 2021-06-24 ENCOUNTER — HOSPITAL ENCOUNTER (EMERGENCY)
Facility: MEDICAL CENTER | Age: 61
End: 2021-06-24
Attending: EMERGENCY MEDICINE
Payer: COMMERCIAL

## 2021-06-24 VITALS
WEIGHT: 286.6 LBS | OXYGEN SATURATION: 96 % | BODY MASS INDEX: 43.44 KG/M2 | HEIGHT: 68 IN | HEART RATE: 63 BPM | RESPIRATION RATE: 18 BRPM | SYSTOLIC BLOOD PRESSURE: 137 MMHG | DIASTOLIC BLOOD PRESSURE: 75 MMHG | TEMPERATURE: 98.1 F

## 2021-06-24 DIAGNOSIS — M79.605 PAIN OF LEFT LOWER EXTREMITY: ICD-10-CM

## 2021-06-24 PROCEDURE — 96374 THER/PROPH/DIAG INJ IV PUSH: CPT

## 2021-06-24 PROCEDURE — A9270 NON-COVERED ITEM OR SERVICE: HCPCS | Performed by: EMERGENCY MEDICINE

## 2021-06-24 PROCEDURE — 700102 HCHG RX REV CODE 250 W/ 637 OVERRIDE(OP): Performed by: EMERGENCY MEDICINE

## 2021-06-24 PROCEDURE — 700105 HCHG RX REV CODE 258: Performed by: EMERGENCY MEDICINE

## 2021-06-24 PROCEDURE — 700101 HCHG RX REV CODE 250: Performed by: EMERGENCY MEDICINE

## 2021-06-24 PROCEDURE — 99284 EMERGENCY DEPT VISIT MOD MDM: CPT

## 2021-06-24 RX ORDER — METHYLPREDNISOLONE 4 MG/1
TABLET ORAL
Qty: 21 EACH | Refills: 0 | Status: SHIPPED | OUTPATIENT
Start: 2021-06-24 | End: 2021-09-14

## 2021-06-24 RX ORDER — PREGABALIN 100 MG/1
100 CAPSULE ORAL ONCE
Status: COMPLETED | OUTPATIENT
Start: 2021-06-24 | End: 2021-06-24

## 2021-06-24 RX ORDER — PREGABALIN 100 MG/1
100 CAPSULE ORAL 2 TIMES DAILY
Qty: 60 CAPSULE | Refills: 0 | Status: SHIPPED | OUTPATIENT
Start: 2021-06-24 | End: 2021-07-24

## 2021-06-24 RX ADMIN — KETAMINE HYDROCHLORIDE 25 MG: 10 INJECTION, SOLUTION INTRAMUSCULAR; INTRAVENOUS at 14:01

## 2021-06-24 RX ADMIN — PREGABALIN 100 MG: 100 CAPSULE ORAL at 12:17

## 2021-06-24 ASSESSMENT — PAIN DESCRIPTION - PAIN TYPE: TYPE: ACUTE PAIN;SURGICAL PAIN

## 2021-06-24 ASSESSMENT — FIBROSIS 4 INDEX: FIB4 SCORE: 1.52

## 2021-06-24 NOTE — ED TRIAGE NOTES
Pt bib family with c/o left leg and calf pain. Per the pt she developed left leg numbness in March. This past week she had a left hip replacement by Dr. Dotson which she believed would alleviate the issue. Today she has developed severe left leg and calf pain and tightness.

## 2021-06-24 NOTE — ED NOTES
"Pt family belligerent towards medical and ancillary staff due to wait time. While escorting pt back to assigned bed the son yelled at [facility] staff who was pushing a cart to \"watch where you're going.\" Charge RN notified  "

## 2021-06-24 NOTE — ED NOTES
Pain down to 2/10. Discharged to home with instructions and prescriptions to pharmacy. Son will drive her home.

## 2021-06-24 NOTE — ED PROVIDER NOTES
ED Provider Note    CHIEF COMPLAINT  Chief Complaint   Patient presents with   • Hip Pain   • Leg Pain       HPI  Sydney Carlin is a 60 y.o. female who presents with hip and leg pain.  Patient reports she is right hip and leg pain for months, leg pain is described as cramping behind her calf, she reports that the pain radiates from her buttocks but is most pronounced in her calf.  She reports associated paresthesias of the back of her left leg.  Patient denies any associated saddle anesthesias or bowel or bladder incontinence.  Of note patient had MRIs of her entire spine in May.  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  She had a hip replacement by Dr. Pearson recently, the pain is since worsened since the operation, therefore patient underwent a ultrasound of her left lower extremity, of which she has had many in the past, which was negative.  Patient is on prophylactic Lovenox.  Patient denies any fevers chills or history of IV drug use.    REVIEW OF SYSTEMS  ROS    See HPI for further details. All other systems are negative.     PAST MEDICAL HISTORY   has a past medical history of Allergy, Arrhythmia, Arthritis, Back pain, Birth control, Bronchitis (2019), Cancer (HCC) (07/2016), Delayed emergence from general anesthesia, Diabetes (HCC), Fallopian tube cancer, carcinoma (HCC) (2016), Fatty liver, H/O: pneumonia, Heart burn, Hiatus hernia syndrome, Hyperplastic colon polyp, Lactose intolerance, Liver fatty degeneration, Obesity, Osteoarthritis, Pneumonia, PONV (postoperative nausea and vomiting), Seasonal allergic rhinitis (4/24/2017), Sleep apnea (11/20/2012), Snoring, SVT (supraventricular tachycardia) (HCC), Thyroid nodule (8/15/2013), and Venous insufficiency 2/2019 in setting of LE edema left>right.    SOCIAL HISTORY  Social History     Tobacco Use   • Smoking status: Never Smoker   • Smokeless tobacco: Never Used    Vaping Use   • Vaping Use: Never used   Substance and Sexual Activity   • Alcohol use: Yes     Alcohol/week: 0.5 oz     Types: 1 Glasses of wine per week     Comment: occasional   • Drug use: Yes     Types: Inhaled     Comment: CBD when needed    • Sexual activity: Yes     Partners: Male       SURGICAL HISTORY   has a past surgical history that includes tonsillectomy (6 years old); ankle ligament reconstruction (08/2001); lumpectomy; lumpectomy (1995); other abdominal surgery (05/2019); abdominal hysterectomy total (2016); other (Right, 07/2020); other (Right); and total hip arthroplasty (Left, 6/15/2021).    CURRENT MEDICATIONS  Home Medications    **Home medications have not yet been reviewed for this encounter**         ALLERGIES  Allergies   Allergen Reactions   • Asa [Aspirin] Rash   • Contrast Media With Iodine [Iodine]    • Buffy    • Ibuprofen Rash     t   • Oseltamivir Phosphate Rash   • Skin Adhesives [Mecrylate] Rash     EKG lead adhesive     • Sulfa Drugs Swelling and Hives   • Tamiflu      Rash       PHYSICAL EXAM  Vitals:    06/24/21 1005   BP: 128/67   Pulse: 68   Resp: 18   Temp: 36.7 °C (98.1 °F)   SpO2: 99%       Physical Exam  Constitutional:       Appearance: She is well-developed.   HENT:      Head: Normocephalic and atraumatic.   Eyes:      Conjunctiva/sclera: Conjunctivae normal.   Pulmonary:      Effort: Pulmonary effort is normal.   Musculoskeletal:      Cervical back: Normal range of motion and neck supple.      Comments: Patient without any pain to palpation of her right calf.  Distal pulses are 2+.  Sensations intact throughout.  EHL is 5 out of 5.  Proximal musculature is 5 out of 5.  External rotation and axial loading of left hip are unremarkable.  Compartments are soft, tissues are warm well perfused, no overlying erythema induration or focal area of fluctuance   Skin:     General: Skin is warm.   Neurological:      Mental Status: She is alert and oriented to person, place, and  time.   Psychiatric:         Behavior: Behavior normal.           DIAGNOSTIC STUDIES / PROCEDURES          COURSE & MEDICAL DECISION MAKING  Pertinent Labs & Imaging studies reviewed. (See chart for details)    Patient here with chief complaint of ongoing left lower extremity pain.  Patient is already on narcotics for her pain.  She is already had work-up including ultrasound of her affected extremity and recent MRIs as well as physical therapy for her ailment.  This is thought to be secondary to sciatica or piriformis syndrome per patient's orthopedic physician and patient's a physical therapist.  Patient without any evidence of cauda equina or infection at this point.  Patient without any focal tenderness on exam.  This appears to be chronic issue.  I have started patient on Lyrica to help with her pain.  I have asked her to follow-up with her pain specialist and also given her follow-up with an urgent care pain facility for possible epidural or alternative therapy.  I have given patient a Medrol Dosepak for her symptoms.  Furthermore I have given patient some low-dose ketamine.  Following this patient with moderate improvement of her symptoms.  She will follow up with her pain specialist and her orthopedic surgeon.  I have discussed the case with her orthopedic surgeon who does not have any other further concerns and agrees with plan.  There is no evidence of infection on exam, patient's distal pulses are 2+ and tissues are warm well perfused       The patient will return for worsening symptoms and is stable at the time of discharge. The patient verbalizes understanding and will comply.    FINAL IMPRESSION    1. Pain of left lower extremity            Electronically signed by: Steve Moreno M.D., 6/24/2021 11:44 AM

## 2021-08-12 ENCOUNTER — OFFICE VISIT (OUTPATIENT)
Dept: URGENT CARE | Facility: CLINIC | Age: 61
End: 2021-08-12
Payer: COMMERCIAL

## 2021-08-12 ENCOUNTER — HOSPITAL ENCOUNTER (OUTPATIENT)
Facility: MEDICAL CENTER | Age: 61
End: 2021-08-12
Attending: NURSE PRACTITIONER
Payer: COMMERCIAL

## 2021-08-12 VITALS
SYSTOLIC BLOOD PRESSURE: 112 MMHG | HEART RATE: 84 BPM | BODY MASS INDEX: 43.07 KG/M2 | WEIGHT: 284.2 LBS | DIASTOLIC BLOOD PRESSURE: 56 MMHG | RESPIRATION RATE: 16 BRPM | OXYGEN SATURATION: 95 % | TEMPERATURE: 99.2 F | HEIGHT: 68 IN

## 2021-08-12 DIAGNOSIS — J34.89 NASAL CONGESTION WITH RHINORRHEA: ICD-10-CM

## 2021-08-12 DIAGNOSIS — J02.9 SORE THROAT: ICD-10-CM

## 2021-08-12 DIAGNOSIS — R09.81 NASAL CONGESTION WITH RHINORRHEA: ICD-10-CM

## 2021-08-12 DIAGNOSIS — R05.9 COUGH: ICD-10-CM

## 2021-08-12 PROCEDURE — U0003 INFECTIOUS AGENT DETECTION BY NUCLEIC ACID (DNA OR RNA); SEVERE ACUTE RESPIRATORY SYNDROME CORONAVIRUS 2 (SARS-COV-2) (CORONAVIRUS DISEASE [COVID-19]), AMPLIFIED PROBE TECHNIQUE, MAKING USE OF HIGH THROUGHPUT TECHNOLOGIES AS DESCRIBED BY CMS-2020-01-R: HCPCS

## 2021-08-12 PROCEDURE — U0005 INFEC AGEN DETEC AMPLI PROBE: HCPCS

## 2021-08-12 PROCEDURE — 99203 OFFICE O/P NEW LOW 30 MIN: CPT | Mod: CS | Performed by: NURSE PRACTITIONER

## 2021-08-12 RX ORDER — PREDNISONE 50 MG/1
TABLET ORAL
COMMUNITY
Start: 2021-07-23 | End: 2021-09-06

## 2021-08-12 RX ORDER — HYDROCODONE BITARTRATE AND ACETAMINOPHEN 5; 325 MG/1; MG/1
TABLET ORAL
COMMUNITY
Start: 2021-07-14 | End: 2021-09-06

## 2021-08-12 RX ORDER — ONDANSETRON HYDROCHLORIDE 8 MG/1
TABLET, FILM COATED ORAL
COMMUNITY
Start: 2021-07-29 | End: 2021-12-14

## 2021-08-12 RX ORDER — PREDNISONE 50 MG/1
TABLET ORAL
COMMUNITY
Start: 2021-07-21 | End: 2021-09-06

## 2021-08-12 RX ORDER — TRAMADOL HYDROCHLORIDE 50 MG/1
50 TABLET ORAL
COMMUNITY
End: 2021-09-14

## 2021-08-12 RX ORDER — DEXAMETHASONE 1 MG
TABLET ORAL
COMMUNITY
Start: 2021-03-05

## 2021-08-12 RX ORDER — BLOOD SUGAR DIAGNOSTIC
1 STRIP MISCELLANEOUS
COMMUNITY
Start: 2021-08-09

## 2021-08-12 RX ORDER — METHOCARBAMOL 750 MG/1
TABLET, FILM COATED ORAL
COMMUNITY
Start: 2021-05-26 | End: 2021-09-06

## 2021-08-12 RX ORDER — OLAPARIB 150 MG/1
TABLET, FILM COATED ORAL
COMMUNITY
Start: 2021-08-04 | End: 2021-09-14

## 2021-08-12 RX ORDER — DIPHENHYDRAMINE HCL 25 MG
CAPSULE ORAL
COMMUNITY
Start: 2021-07-21 | End: 2021-09-06

## 2021-08-12 RX ORDER — GABAPENTIN 300 MG/1
1 CAPSULE ORAL
COMMUNITY
Start: 2021-08-02 | End: 2021-09-14

## 2021-08-12 RX ORDER — TRAMADOL HYDROCHLORIDE 50 MG/1
50 TABLET ORAL
COMMUNITY
Start: 2021-05-21 | End: 2021-09-14

## 2021-08-12 RX ORDER — NYSTATIN 100000 [USP'U]/G
POWDER TOPICAL
COMMUNITY
Start: 2021-07-27 | End: 2021-09-06

## 2021-08-12 RX ORDER — AMOXICILLIN 500 MG/1
CAPSULE ORAL
COMMUNITY
Start: 2021-08-03 | End: 2021-09-06

## 2021-08-12 RX ORDER — DEXAMETHASONE 4 MG/1
4 TABLET ORAL DAILY
COMMUNITY
Start: 2021-07-14 | End: 2021-09-14

## 2021-08-12 RX ORDER — ACETAMINOPHEN 500 MG
500 TABLET ORAL
COMMUNITY
End: 2021-09-14

## 2021-08-12 RX ORDER — LANCETS 33 GAUGE
EACH MISCELLANEOUS
COMMUNITY
Start: 2021-03-16

## 2021-08-12 RX ORDER — GABAPENTIN 300 MG/1
CAPSULE ORAL
COMMUNITY
Start: 2021-08-02 | End: 2021-09-14

## 2021-08-12 RX ORDER — LIDOCAINE AND PRILOCAINE 25; 25 MG/G; MG/G
CREAM TOPICAL
COMMUNITY
Start: 2020-09-18 | End: 2021-09-18

## 2021-08-12 RX ORDER — LANCETS 33 GAUGE
EACH MISCELLANEOUS
COMMUNITY
Start: 2021-08-09

## 2021-08-12 RX ORDER — INSULIN GLARGINE 300 U/ML
14 INJECTION, SOLUTION SUBCUTANEOUS DAILY
COMMUNITY
Start: 2021-08-09 | End: 2021-11-07

## 2021-08-12 RX ORDER — ONDANSETRON HYDROCHLORIDE 8 MG/1
1 TABLET, FILM COATED ORAL
COMMUNITY
Start: 2021-07-14 | End: 2021-12-14

## 2021-08-12 RX ORDER — BLOOD SUGAR DIAGNOSTIC
STRIP MISCELLANEOUS
COMMUNITY
Start: 2021-03-16

## 2021-08-12 RX ORDER — FLURBIPROFEN SODIUM 0.3 MG/ML
SOLUTION/ DROPS OPHTHALMIC
COMMUNITY
Start: 2021-07-20

## 2021-08-12 RX ORDER — METHOCARBAMOL 500 MG/1
500 TABLET, FILM COATED ORAL
COMMUNITY
End: 2021-09-06

## 2021-08-12 ASSESSMENT — ENCOUNTER SYMPTOMS
EYE REDNESS: 0
ABDOMINAL PAIN: 0
WEAKNESS: 0
EYE DISCHARGE: 0
HEADACHES: 0
NAUSEA: 0
COUGH: 1
VOMITING: 0
FEVER: 0
CONSTIPATION: 0
NECK PAIN: 0
MYALGIAS: 0
SHORTNESS OF BREATH: 0
CHILLS: 0
WHEEZING: 0
DIARRHEA: 0
SORE THROAT: 1
DIZZINESS: 0

## 2021-08-12 ASSESSMENT — FIBROSIS 4 INDEX: FIB4 SCORE: 1.52

## 2021-08-12 NOTE — PROGRESS NOTES
Subjective:      Sydney Carlin is a 60 y.o. female who presents with Coronavirus Screening (sore throat,sneezing,cough,runny nose x 2 days)            HPI  States cough, sore throat, sneezing, nasal congestion, runny nose x 2 days. History of cancer. No over the counter meds for symptoms. Denies fever, chills, body aches, fully vaccinated for COVID. States may be smoke from sires and seasonal allergies. Has chela pot.     PMH:  has a past medical history of Allergy, Arrhythmia, Arthritis, Back pain, Birth control, Bronchitis (2019), Cancer (HCC) (07/2016), Delayed emergence from general anesthesia, Diabetes (HCC), Fallopian tube cancer, carcinoma (HCC) (2016), Fatty liver, H/O: pneumonia, Heart burn, Hiatus hernia syndrome, Hyperplastic colon polyp, Lactose intolerance, Liver fatty degeneration, Obesity, Osteoarthritis, Pneumonia, PONV (postoperative nausea and vomiting), Seasonal allergic rhinitis (4/24/2017), Sleep apnea (11/20/2012), Snoring, SVT (supraventricular tachycardia) (HCC), Thyroid nodule (8/15/2013), and Venous insufficiency 2/2019 in setting of LE edema left>right.  MEDS:   Current Outpatient Medications:   •  diphenhydrAMINE (BENADRYL) 25 MG capsule, Benadryl 50 mg, 2 tab, by mouth 1 hour prior to CT scan., Disp: , Rfl:   •  gabapentin (NEURONTIN) 300 MG Cap, Take 1 Capsule by mouth., Disp: , Rfl:   •  gabapentin (NEURONTIN) 300 MG Cap, , Disp: , Rfl:   •  lidocaine-prilocaine (EMLA) 2.5-2.5 % Cream, Apply  topically., Disp: , Rfl:   •  methocarbamol (ROBAXIN) 750 MG Tab, , Disp: , Rfl:   •  methocarbamol (ROBAXIN) 500 MG Tab, Take 500 mg by mouth., Disp: , Rfl:   •  nystatin (NYSTOP) powder, Apply  topically., Disp: , Rfl:   •  NYSTOP powder, APPLY TOPICALLY 4 TIMES DAILY, Disp: , Rfl:   •  Olaparib 150 MG Tab, Take 300 mg by mouth., Disp: , Rfl:   •  LYNPARZA 150 MG Tab, , Disp: , Rfl:   •  ondansetron (ZOFRAN) 8 MG Tab, Take 1 Tablet by mouth., Disp: , Rfl:   •  ondansetron (ZOFRAN) 8 MG  Tab, , Disp: , Rfl:   •  acetaminophen (TYLENOL) 500 MG Tab, Take 500 mg by mouth., Disp: , Rfl:   •  glucose blood (ONETOUCH VERIO) strip, ONETOUCH VERIO STRP, Disp: , Rfl:   •  glucose blood (ONETOUCH VERIO) strip, 1 Strip by Extracorporeal route., Disp: , Rfl:   •  Insulin Glargine, 1 Unit Dial, (TOUJEO SOLOSTAR) 300 UNIT/ML Solution Pen-injector, Inject 14 Units under the skin every day., Disp: , Rfl:   •  Insulin Pen Needle (B-D UF III MINI PEN NEEDLES) 31G X 5 MM Misc, , Disp: , Rfl:   •  Lancets (ONETOUCH DELICA PLUS [id number]) Misc, ONETOUCH DELICA PLUS [id number], Disp: , Rfl:   •  OneTouch Delica Lancets 33G Misc, , Disp: , Rfl:   •  metFORMIN (GLUCOPHAGE) 500 MG Tab, Take 1,000 mg by mouth., Disp: , Rfl:   •  metFORMIN (GLUCOPHAGE) 500 MG Tab, , Disp: , Rfl:   •  B-D UF III MINI PEN NEEDLES 31G X 5 MM Misc, USE ONE - DAILY AS DIRECTED, Disp: 100 Each, Rfl: 2  •  methylPREDNISolone (MEDROL DOSEPAK) 4 MG Tablet Therapy Pack, Use as directed, Disp: 21 Each, Rfl: 0  •  Acetaminophen 500 MG Cap, TYLENOL CAPS, Disp: , Rfl:   •  folic acid (FOLVITE) 1 MG Tab, Take 400 mcg by mouth every day., Disp: , Rfl:   •  METFORMIN HCL PO, Take  by mouth., Disp: , Rfl:   •  Insulin Glargine, 1 Unit Dial, (TOUJEO SOLOSTAR) 300 UNIT/ML Solution Pen-injector, Inject 10 Units as instructed every day., Disp: 3 Each, Rfl: 3  •  Lancets (ONETOUCH DELICA PLUS [id number]) Misc, USE TO TEST UP TO 6 TIMES PER DAY AS NEEDED FOR SYMPTOMS OF HIGH OR LOW SUGAR, Disp: 100 Each, Rfl: 3  •  NON SPECIFIED, Transfer Factor Plus Supplement, Disp: , Rfl:   •  ONETOUCH ULTRA strip, USE TO TEST UP TO SIX TIMES A DAY AS NEEDED FOR SYMPTOMS OF HIGH OR LOW SUGARS, Disp: 100 Strip, Rfl: 3  •  Glycerin-Polysorbate 80 (REFRESH DRY EYE THERAPY OP), by Ophthalmic route., Disp: , Rfl:   •  acetaminophen (TYLENOL) 500 MG Tab, Take 1,000 mg by mouth 2 times a day as needed for Mild Pain., Disp: , Rfl:   •  Nutritional Supplements (JUICE PLUS FIBRE PO), Take   by mouth., Disp: , Rfl:   •  Cyanocobalamin (B-12 PO), Take 1 tablet by mouth every morning., Disp: , Rfl:   •  Cholecalciferol (VITAMIN D3) 2000 UNIT Cap, Take  by mouth. 10,000 units every other day alternating with 5000 units every other day, Disp: , Rfl:   •  amoxicillin (AMOXIL) 500 MG Cap, , Disp: , Rfl:   •  dexamethasone (DECADRON) 4 MG Tab, Take 4 mg by mouth every day. (Patient not taking: Reported on 8/12/2021), Disp: , Rfl:   •  dexamethasone (DECADRON) 1 MG Tab, DEXAMETHASONE 1 MG TABS (Patient not taking: Reported on 8/12/2021), Disp: , Rfl:   •  enoxaparin (LOVENOX) 40 MG/0.4ML Solution inj, , Disp: , Rfl:   •  HYDROcodone-acetaminophen (NORCO) 5-325 MG Tab per tablet, TAKE 1 TABLET BY MOUTH EVERY 8 HOURS AS NEEDED FOR PAIN. NOT TO EXCEED 4 GM OF ACETAMINOPHEN A DAY. 1 TIME PRESCRIPTION FOR PAIN RELIEF UNTIL (Patient not taking: Reported on 8/12/2021), Disp: , Rfl:   •  predniSONE (DELTASONE) 50 MG Tab, Prednisone 50 mg, 1 tab, by mouth 13 hrs, 7 hrs and 1 hr prior to CT scan. With benadryl 50 mg, by mouth, 1 hour prior to CT scan. (Patient not taking: Reported on 8/12/2021), Disp: , Rfl:   •  predniSONE (DELTASONE) 50 MG Tab, , Disp: , Rfl:   •  traMADol (ULTRAM) 50 MG Tab, Take 50 mg by mouth. (Patient not taking: Reported on 8/12/2021), Disp: , Rfl:   •  traMADol (ULTRAM) 50 MG Tab, Take 50 mg by mouth. (Patient not taking: Reported on 8/12/2021), Disp: , Rfl:   •  rivaroxaban (XARELTO) 10 MG Tab tablet, Take 1 tablet by mouth with dinner. (Patient not taking: Reported on 8/12/2021), Disp: 30 tablet, Rfl: 0  •  traMADol (ULTRAM) 50 MG Tab, Take 50 mg by mouth every four hours as needed. (Patient not taking: Reported on 8/12/2021), Disp: , Rfl:   •  Omega 3 1000 MG Cap, Take 1,000 mg by mouth every day. (Patient not taking: Reported on 8/12/2021), Disp: , Rfl:   ALLERGIES:   Allergies   Allergen Reactions   • Aspirin Rash and Hives     Other reaction(s): Rash, urticarial   • Ginger Rash     Other  reaction(s): Fever   • Oseltamivir      Other reaction(s): GI Upset   • Sulfa Drugs Swelling and Hives     Other reaction(s): Rash, urticarial   • Tamiflu      Rash   • Ibuprofen Rash     t   • Oseltamivir Phosphate Rash   • Skin Adhesives [Mecrylate] Rash     EKG lead adhesive     • Temafloxacin      SURGHX:   Past Surgical History:   Procedure Laterality Date   • PB TOTAL HIP ARTHROPLASTY Left 6/15/2021    Procedure: ARTHROPLASTY, HIP, TOTAL, ANTERIOR APPROACH.;  Surgeon: Steve Dotson M.D.;  Location: SURGERY [facility];  Service: Orthopedics   • OTHER Right 07/2020    cerebellum cystic mass excision   • OTHER ABDOMINAL SURGERY  05/2019    Ventral Hernia Repair   • ABDOMINAL HYSTERECTOMY TOTAL  2016   • ANKLE LIGAMENT RECONSTRUCTION  08/2001    right ankle surgery   • LUMPECTOMY  1995    benign   • LUMPECTOMY      benign   • OTHER Right     Chest Power Port   • TONSILLECTOMY  6 years old     SOCHX:  reports that she has never smoked. She has never used smokeless tobacco. She reports current alcohol use of about 0.5 oz of alcohol per week. She reports current drug use. Drug: Inhaled.  FH: Family history was reviewed, no pertinent findings to report    Review of Systems   Constitutional: Positive for malaise/fatigue. Negative for chills and fever.   HENT: Positive for congestion and sore throat. Negative for ear pain.    Eyes: Negative for discharge and redness.   Respiratory: Positive for cough. Negative for shortness of breath and wheezing.    Gastrointestinal: Negative for abdominal pain, constipation, diarrhea, nausea and vomiting.   Musculoskeletal: Negative for myalgias and neck pain.   Skin: Negative for itching and rash.   Neurological: Negative for dizziness, weakness and headaches.   Endo/Heme/Allergies: Positive for environmental allergies.   All other systems reviewed and are negative.         Objective:     /56 (BP Location: Left arm, Patient Position: Sitting)   Pulse 84   Temp 37.3 °C  "(99.2 °F) (Temporal)   Resp 16   Ht 1.727 m (5' 8\")   Wt (!) 129 kg (284 lb 3.2 oz)   SpO2 95%   BMI 43.21 kg/m²      Physical Exam  Vitals reviewed.   Constitutional:       General: She is awake. She is not in acute distress.     Appearance: Normal appearance. She is well-developed. She is not ill-appearing, toxic-appearing or diaphoretic.   HENT:      Head: Normocephalic.      Right Ear: Ear canal and external ear normal. A middle ear effusion is present.      Left Ear: Ear canal and external ear normal. A middle ear effusion is present.      Nose: Congestion present.      Mouth/Throat:      Lips: Pink.      Mouth: Mucous membranes are dry.      Pharynx: Oropharynx is clear. Uvula midline.   Eyes:      Conjunctiva/sclera: Conjunctivae normal.      Pupils: Pupils are equal, round, and reactive to light.   Cardiovascular:      Rate and Rhythm: Normal rate.   Pulmonary:      Effort: Pulmonary effort is normal.      Breath sounds: Normal breath sounds and air entry. No stridor, decreased air movement or transmitted upper airway sounds. No decreased breath sounds, wheezing, rhonchi or rales.   Musculoskeletal:         General: Normal range of motion.      Cervical back: Normal range of motion and neck supple.   Skin:     General: Skin is warm and dry.   Neurological:      Mental Status: She is alert and oriented to person, place, and time.   Psychiatric:         Attention and Perception: Attention normal.         Mood and Affect: Mood normal.         Speech: Speech normal.         Behavior: Behavior normal. Behavior is cooperative.                        Assessment/Plan:        1. Nasal congestion with rhinorrhea    - SARS-CoV-2 PCR (24 hour In-House): Collect NP swab in VTM; Future    2. Sore throat    - SARS-CoV-2 PCR (24 hour In-House): Collect NP swab in VTM; Future    3. Cough    - SARS-CoV-2 PCR (24 hour In-House): Collect NP swab in VTM; Future    -Stay home isolated from others until COVID test resulted " the follow CDC guidelines for positive cases as discussed  -Increase water intake  -May use Tylenol/Ibuprofen as needed for fever or body aches  -Get rest  -Salt water gargle as needed   -Flonase, saline nasal spray as needed for nasal congestion  -May use over the counter cough suppressant medications like plain Robitussin/Delsym as needed   -Monitor for fevers, worse cough, shortness of breath, chest pain, chest tightness, sinus problems- need re-evaluation  -MyChart release for result, may take up to 24-36 hrs for result, patient notified

## 2021-08-13 DIAGNOSIS — J34.89 NASAL CONGESTION WITH RHINORRHEA: ICD-10-CM

## 2021-08-13 DIAGNOSIS — J02.9 SORE THROAT: ICD-10-CM

## 2021-08-13 DIAGNOSIS — R09.81 NASAL CONGESTION WITH RHINORRHEA: ICD-10-CM

## 2021-08-13 DIAGNOSIS — R05.9 COUGH: ICD-10-CM

## 2021-08-13 LAB
COVID ORDER STATUS COVID19: NORMAL
SARS-COV-2 RNA RESP QL NAA+PROBE: NOTDETECTED
SPECIMEN SOURCE: NORMAL

## 2021-09-06 ENCOUNTER — APPOINTMENT (OUTPATIENT)
Dept: RADIOLOGY | Facility: IMAGING CENTER | Age: 61
End: 2021-09-06
Attending: PHYSICIAN ASSISTANT
Payer: COMMERCIAL

## 2021-09-06 ENCOUNTER — OFFICE VISIT (OUTPATIENT)
Dept: URGENT CARE | Facility: CLINIC | Age: 61
End: 2021-09-06
Payer: COMMERCIAL

## 2021-09-06 ENCOUNTER — HOSPITAL ENCOUNTER (OUTPATIENT)
Facility: MEDICAL CENTER | Age: 61
End: 2021-09-06
Attending: PHYSICIAN ASSISTANT
Payer: COMMERCIAL

## 2021-09-06 VITALS
OXYGEN SATURATION: 94 % | HEART RATE: 75 BPM | DIASTOLIC BLOOD PRESSURE: 68 MMHG | SYSTOLIC BLOOD PRESSURE: 114 MMHG | TEMPERATURE: 97.3 F | WEIGHT: 272.9 LBS | BODY MASS INDEX: 41.36 KG/M2 | RESPIRATION RATE: 16 BRPM | HEIGHT: 68 IN

## 2021-09-06 DIAGNOSIS — Z79.4 TYPE 2 DIABETES MELLITUS TREATED WITH INSULIN (HCC): ICD-10-CM

## 2021-09-06 DIAGNOSIS — E11.9 TYPE 2 DIABETES MELLITUS TREATED WITH INSULIN (HCC): ICD-10-CM

## 2021-09-06 DIAGNOSIS — C79.9 METASTATIC CARCINOMA (HCC): ICD-10-CM

## 2021-09-06 DIAGNOSIS — J01.00 ACUTE MAXILLARY SINUSITIS, RECURRENCE NOT SPECIFIED: ICD-10-CM

## 2021-09-06 DIAGNOSIS — R05.9 COUGH: ICD-10-CM

## 2021-09-06 PROCEDURE — U0003 INFECTIOUS AGENT DETECTION BY NUCLEIC ACID (DNA OR RNA); SEVERE ACUTE RESPIRATORY SYNDROME CORONAVIRUS 2 (SARS-COV-2) (CORONAVIRUS DISEASE [COVID-19]), AMPLIFIED PROBE TECHNIQUE, MAKING USE OF HIGH THROUGHPUT TECHNOLOGIES AS DESCRIBED BY CMS-2020-01-R: HCPCS

## 2021-09-06 PROCEDURE — U0005 INFEC AGEN DETEC AMPLI PROBE: HCPCS

## 2021-09-06 PROCEDURE — 71046 X-RAY EXAM CHEST 2 VIEWS: CPT | Mod: TC | Performed by: PHYSICIAN ASSISTANT

## 2021-09-06 PROCEDURE — 99214 OFFICE O/P EST MOD 30 MIN: CPT | Mod: CS | Performed by: PHYSICIAN ASSISTANT

## 2021-09-06 RX ORDER — BENZONATATE 100 MG/1
200 CAPSULE ORAL 3 TIMES DAILY PRN
Qty: 60 CAPSULE | Refills: 0 | Status: SHIPPED
Start: 2021-09-06 | End: 2021-09-06

## 2021-09-06 RX ORDER — BENZONATATE 100 MG/1
200 CAPSULE ORAL 3 TIMES DAILY PRN
Qty: 60 CAPSULE | Refills: 0 | Status: SHIPPED | OUTPATIENT
Start: 2021-09-06 | End: 2022-01-23

## 2021-09-06 RX ORDER — DEXTROMETHORPHAN HYDROBROMIDE AND PROMETHAZINE HYDROCHLORIDE 15; 6.25 MG/5ML; MG/5ML
5 SYRUP ORAL 4 TIMES DAILY PRN
Qty: 100 ML | Refills: 0 | Status: SHIPPED
Start: 2021-09-06 | End: 2021-09-06

## 2021-09-06 RX ORDER — AMOXICILLIN AND CLAVULANATE POTASSIUM 875; 125 MG/1; MG/1
1 TABLET, FILM COATED ORAL 2 TIMES DAILY
Qty: 10 TABLET | Refills: 0 | Status: SHIPPED
Start: 2021-09-06 | End: 2021-09-06

## 2021-09-06 RX ORDER — DEXTROMETHORPHAN HYDROBROMIDE AND PROMETHAZINE HYDROCHLORIDE 15; 6.25 MG/5ML; MG/5ML
5 SYRUP ORAL 4 TIMES DAILY PRN
Qty: 100 ML | Refills: 0 | Status: SHIPPED | OUTPATIENT
Start: 2021-09-06 | End: 2021-09-11

## 2021-09-06 RX ORDER — AMOXICILLIN AND CLAVULANATE POTASSIUM 875; 125 MG/1; MG/1
1 TABLET, FILM COATED ORAL 2 TIMES DAILY
Qty: 10 TABLET | Refills: 0 | Status: SHIPPED | OUTPATIENT
Start: 2021-09-06 | End: 2021-09-11

## 2021-09-06 ASSESSMENT — ENCOUNTER SYMPTOMS
COUGH: 1
HEADACHES: 1
MYALGIAS: 1
CHILLS: 0
FEVER: 0
SINUS PAIN: 1

## 2021-09-06 ASSESSMENT — FIBROSIS 4 INDEX: FIB4 SCORE: 1.46

## 2021-09-06 NOTE — PATIENT INSTRUCTIONS
INSTRUCTIONS FOR COVID-19 OR ANY OTHER INFECTIOUS RESPIRATORY ILLNESSES    The Centers for Disease Control and Prevention (CDC) states that early indications for COVID-19 include cough, shortness of breath, difficulty breathing, or at least two of the following symptoms: chills, shaking with chills, muscle pain, headache, sore throat, and loss of taste or smell. Symptoms can range from mild to severe and may appear up to two weeks after exposure to the virus.    The practice of self-isolation and quarantine helps protect the public and your family by  preventing exposure to people who have or may have a contagious disease. Please follow the prevention steps below as based on CDC guidelines:    WHEN TO STOP ISOLATION: Persons with COVID-19 or any other infectious respiratory illness who have symptoms and were advised to care for themselves at home may discontinue home isolation under the following conditions:  · At least 24 hours have passed since recovery defined as resolution of fever without the use of fever-reducing medications; AND,  · Improvement in respiratory symptoms (e.g., cough, shortness of breath); AND,  · At least 10 days have passed since symptoms first appeared and have had no subsequent illness.    MONITOR YOUR SYMPTOMS: If your illness is worsening, seek prompt medical attention. If you have a medical emergency and need to call 911, notify the dispatch personnel that you have, or are being evaluated for confirmed or suspected COVID-19 or another infectious respiratory illness. Wear a facemask if possible.    ACTIVITY RESTRICTION: restrict activities outside your home, except for getting medical care. Do not go to work, school, or public areas. Avoid using public transportation, ride-sharing, or taxis.    SCHEDULED MEDICAL APPOINTMENTS: Notify your provider that you have, or are being evaluated for, confirmed or suspected COVID-19 or another infectious respiratory. This will help the healthcare  provider’s office safely take care of you and keep other people from getting exposed or infected.    FACEMASKS, when to wear: Anytime you are away from your home or around other people or pets. If you are unable to wear one, maintain a minimum of 6 feet distancing from others.    LIVING ENVIRONMENT: Stay in a separate room from other people and pets. If possible, use a separate bathroom, have someone else care for your pets and avoid sharing household items. Any items used should be washed thoroughly with soap and water. Clean all “high-touch” surfaces every day. Use a household cleaning spray or wipe, according to the label instructions. High touch surfaces include (but are not limited to) counters, tabletops, doorknobs, bathroom fixtures, toilets, phones, keyboards, tablets, and bedside tables.     HAND WASHING: Frequently wash hands with soap and water for at least 20 seconds,  especially after blowing your nose, coughing, or sneezing; going to the bathroom; before and after interacting with pets; and before and after eating or preparing food. If hands are visibly dirty use soap and water. If soap and water are not available, use an alcohol-based hand  with at least 60% alcohol. Avoid touching your eyes, nose, and mouth with unwashed hands. Cover your coughs and sneezes with a tissue. Throw used tissues in a lined trash can. Immediately wash your hands.    ACTIVE/FACILITATED SELF-MONITORING: Follow instructions provided by your local health department or health professionals, as appropriate. When working with your local health department check their available hours.    [facility]   Phone Number   [facility] [phone]   [facility] [place] [phone]   [address]   [place] [phone]     IF YOU HAVE CONFIRMED POSITIVE COVID-19:    Those who have completely recovered from COVID-19 may have immune-boosting antibodies in their plasma--called “convalescent plasma”--that could be  used to treat critically ill COVID19 patients.    [facility] is excited to begin working with [facility] on collecting convalescent plasma from  people who have recovered from COVID-19 as part of a program to treat patients infected with the virus. This FDA-approved “emergency investigational new drug” is a special blood product containing antibodies that may give patients an extra boost to fight the virus.    To be eligible to donate convalescent plasma, you must have a prior COVID-19 diagnosis documented by a laboratory test (or a positive test result for SARS-CoV-2 antibodies) and meet additional eligibility requirements.    If you are interested in donating convalescent plasma or have any additional questions, please contact the [facility] Convalescent Plasma  at [phone] or via e-mail at [email or web address].    Sinusitis, Adult  Sinusitis is soreness and swelling (inflammation) of your sinuses. Sinuses are hollow spaces in the bones around your face. They are located:  · Around your eyes.  · In the middle of your forehead.  · Behind your nose.  · In your cheekbones.  Your sinuses and nasal passages are lined with a fluid called mucus. Mucus drains out of your sinuses. Swelling can trap mucus in your sinuses. This lets germs (bacteria, virus, or fungus) grow, which leads to infection. Most of the time, this condition is caused by a virus.  What are the causes?  This condition is caused by:  · Allergies.  · Asthma.  · Germs.  · Things that block your nose or sinuses.  · Growths in the nose (nasal polyps).  · Chemicals or irritants in the air.  · Fungus (rare).  What increases the risk?  You are more likely to develop this condition if:  · You have a weak body defense system (immune system).  · You do a lot of swimming or diving.  · You use nasal sprays too much.  · You smoke.  What are the signs or symptoms?  The main symptoms of this condition are pain and a feeling of pressure around  the sinuses. Other symptoms include:  · Stuffy nose (congestion).  · Runny nose (drainage).  · Swelling and warmth in the sinuses.  · Headache.  · Toothache.  · A cough that may get worse at night.  · Mucus that collects in the throat or the back of the nose (postnasal drip).  · Being unable to smell and taste.  · Being very tired (fatigue).  · A fever.  · Sore throat.  · Bad breath.  How is this diagnosed?  This condition is diagnosed based on:  · Your symptoms.  · Your medical history.  · A physical exam.  · Tests to find out if your condition is short-term (acute) or long-term (chronic). Your doctor may:  ? Check your nose for growths (polyps).  ? Check your sinuses using a tool that has a light (endoscope).  ? Check for allergies or germs.  ? Do imaging tests, such as an MRI or CT scan.  How is this treated?  Treatment for this condition depends on the cause and whether it is short-term or long-term.  · If caused by a virus, your symptoms should go away on their own within 10 days. You may be given medicines to relieve symptoms. They include:  ? Medicines that shrink swollen tissue in the nose.  ? Medicines that treat allergies (antihistamines).  ? A spray that treats swelling of the nostrils.   ? Rinses that help get rid of thick mucus in your nose (nasal saline washes).  · If caused by bacteria, your doctor may wait to see if you will get better without treatment. You may be given antibiotic medicine if you have:  ? A very bad infection.  ? A weak body defense system.  · If caused by growths in the nose, you may need to have surgery.  Follow these instructions at home:  Medicines  · Take, use, or apply over-the-counter and prescription medicines only as told by your doctor. These may include nasal sprays.  · If you were prescribed an antibiotic medicine, take it as told by your doctor. Do not stop taking the antibiotic even if you start to feel better.  Hydrate and humidify    · Drink enough water to keep your  pee (urine) pale yellow.  · Use a cool mist humidifier to keep the humidity level in your home above 50%.  · Breathe in steam for 10-15 minutes, 3-4 times a day, or as told by your doctor. You can do this in the bathroom while a hot shower is running.  · Try not to spend time in cool or dry air.  Rest  · Rest as much as you can.  · Sleep with your head raised (elevated).  · Make sure you get enough sleep each night.  General instructions    · Put a warm, moist washcloth on your face 3-4 times a day, or as often as told by your doctor. This will help with discomfort.  · Wash your hands often with soap and water. If there is no soap and water, use hand .  · Do not smoke. Avoid being around people who are smoking (secondhand smoke).  · Keep all follow-up visits as told by your doctor. This is important.  Contact a doctor if:  · You have a fever.  · Your symptoms get worse.  · Your symptoms do not get better within 10 days.  Get help right away if:  · You have a very bad headache.  · You cannot stop throwing up (vomiting).  · You have very bad pain or swelling around your face or eyes.  · You have trouble seeing.  · You feel confused.  · Your neck is stiff.  · You have trouble breathing.  Summary  · Sinusitis is swelling of your sinuses. Sinuses are hollow spaces in the bones around your face.  · This condition is caused by tissues in your nose that become inflamed or swollen. This traps germs. These can lead to infection.  · If you were prescribed an antibiotic medicine, take it as told by your doctor. Do not stop taking it even if you start to feel better.  · Keep all follow-up visits as told by your doctor. This is important.  This information is not intended to replace advice given to you by your health care provider. Make sure you discuss any questions you have with your health care provider.  Document Released: 06/05/2009 Document Revised: 05/20/2019 Document Reviewed: 05/20/2019  [facility] Patient  Education © 2020 [employer]

## 2021-09-06 NOTE — PROGRESS NOTES
"Subjective:   Sydney Carlin  is a 60 y.o. female who presents for Cough ((L) ear pain, lung pain x 5 days )        Cough  Associated symptoms include headaches and myalgias. Pertinent negatives include no chest pain, chills or fever.   Ms. Carlin is a very pleasant 60-year-old female who presents to urgent care with 5-day history of congestion, cough, worsening facial pain and pressure, headache with complaint that she feels as if her \"lungs\" hurt with coughing.  Currently, she denies any chest pain.  Patient has had no fever, chills.  She has had some fatigue.    Patient has history of metastatic cancer for which she is under specialty care.  She is just recently had a repeat PET scan done which did show new lesions.  Patient is currently on Olaparib which she reports can be associated with pneumonitis.    Patient reports green sputum as well as worsening facial pain and pressure and dental pain.  Review of Systems   Constitutional: Positive for malaise/fatigue. Negative for chills and fever.   HENT: Positive for congestion and sinus pain.    Respiratory: Positive for cough.    Cardiovascular: Negative for chest pain.   Musculoskeletal: Positive for myalgias.   Neurological: Positive for headaches.   All other systems reviewed and are negative.    Allergies   Allergen Reactions   • Aspirin Rash and Hives     Other reaction(s): Rash, urticarial   • Buffy Rash     Other reaction(s): Fever   • Oseltamivir      Other reaction(s): GI Upset   • Sulfa Drugs Swelling and Hives     Other reaction(s): Rash, urticarial   • Tamiflu      Rash   • Ibuprofen Rash     t   • Oseltamivir Phosphate Rash   • Skin Adhesives [Mecrylate] Rash     EKG lead adhesive     • Temafloxacin      Reviewed past medical, surgical , social and family history.  Reviewed prescription and over-the-counter medications with patient and electronic health record today.     Objective:   /68 (BP Location: Left arm, Patient Position: Sitting)  " " Pulse 75   Temp 36.3 °C (97.3 °F) (Temporal)   Resp 16   Ht 1.727 m (5' 8\")   Wt 124 kg (272 lb 14.4 oz)   SpO2 94%   BMI 41.49 kg/m²   Physical Exam  Vitals and nursing note reviewed.   Constitutional:       General: She is not in acute distress.     Appearance: She is well-developed. She is not ill-appearing or toxic-appearing.   HENT:      Head: Normocephalic and atraumatic.      Right Ear: Tympanic membrane, ear canal and external ear normal.      Left Ear: Tympanic membrane, ear canal and external ear normal.      Nose: Mucosal edema and congestion present.      Right Turbinates: Swollen.      Left Turbinates: Swollen.      Right Sinus: Maxillary sinus tenderness present. No frontal sinus tenderness.      Left Sinus: Maxillary sinus tenderness present. No frontal sinus tenderness.      Mouth/Throat:      Lips: Pink. No lesions.      Mouth: Mucous membranes are moist.      Pharynx: Oropharynx is clear. Uvula midline. No oropharyngeal exudate.      Comments: Purulent postnasal drip with cobblestoning of the posterior pharynx  Eyes:      General: Lids are normal.      Extraocular Movements: Extraocular movements intact.      Conjunctiva/sclera: Conjunctivae normal.      Pupils: Pupils are equal, round, and reactive to light.   Cardiovascular:      Rate and Rhythm: Normal rate and regular rhythm.      Heart sounds: Normal heart sounds. No murmur heard.   No friction rub. No gallop.    Pulmonary:      Effort: Pulmonary effort is normal. No respiratory distress.      Breath sounds: Normal breath sounds.   Abdominal:      General: Bowel sounds are normal. There is no distension.      Palpations: Abdomen is soft. There is no mass.      Tenderness: There is no abdominal tenderness. There is no guarding or rebound.   Musculoskeletal:         General: No tenderness or deformity. Normal range of motion.      Cervical back: Normal range of motion and neck supple.   Lymphadenopathy:      Head:      Right side of " head: No submental, submandibular or tonsillar adenopathy.      Left side of head: No submental, submandibular or tonsillar adenopathy.      Cervical: No cervical adenopathy.      Upper Body:      Right upper body: No supraclavicular adenopathy.      Left upper body: No supraclavicular adenopathy.   Skin:     General: Skin is warm and dry.      Findings: No rash.   Neurological:      Mental Status: She is alert and oriented to person, place, and time.      Cranial Nerves: Cranial nerves are intact. No cranial nerve deficit.      Sensory: Sensation is intact. No sensory deficit.      Motor: Motor function is intact.      Coordination: Coordination is intact. Coordination normal.      Gait: Gait is intact.   Psychiatric:         Attention and Perception: Attention normal.         Mood and Affect: Mood and affect normal.         Speech: Speech normal.         Behavior: Behavior normal. Behavior is cooperative.         Thought Content: Thought content normal.         Judgment: Judgment normal.           Assessment/Plan:   1. Cough  - DX-CHEST-2 VIEWS; Future  - SARS-CoV-2 PCR (24 hour In-House): Collect NP swab in [facility]; Future  - benzonatate (TESSALON) 100 MG Cap; Take 2 Capsules by mouth 3 times a day as needed.  Dispense: 60 Capsule; Refill: 0  - promethazine-dextromethorphan (PROMETHAZINE-DM) 6.25-15 MG/5ML syrup; Take 5 mL by mouth 4 times a day as needed for Cough for up to 5 days.  Dispense: 100 mL; Refill: 0    2. Acute maxillary sinusitis, recurrence not specified  - amoxicillin-clavulanate (AUGMENTIN) 875-125 MG Tab; Take 1 Tablet by mouth 2 times a day for 5 days.  Dispense: 10 Tablet; Refill: 0    3. Metastatic carcinoma (HCC)    4. Type 2 diabetes mellitus treated with insulin (HCC)    Chest x-ray is obtained, read by radiologist and reviewed by myself with findings as follows:      RADIOLOGY RESULTS   DX-CHEST-2 VIEWS    Result Date: 9/6/2021 9/6/2021 10:04 AM HISTORY/REASON FOR EXAM:  Cough. TECHNIQUE/EXAM  DESCRIPTION AND NUMBER OF VIEWS: Two views of the chest. COMPARISON:  None. FINDINGS: The heart is normal in size. No pulmonary infiltrates or consolidations are noted. No pleural effusions are appreciated. Right port catheter is noted with tip at the level of the SVC. No pneumothorax is identified.     No active disease.           Testing performed for COVID-19.  Patient/guardian is given printed /MyChart instructions regarding self-isolation.  Work/school note is provided with specific return to work/school protocols.  Reviewed with patient/guardian that if they do test positive they will be contacted by their local health department regarding return to work/school protocols.  Results will also be released to patient/guardian in MyChart or called to the patient/guardian directly.  Encouraged mask use, frequent handwashing, wiping down hard surfaces, etc.    Discussed with patient red flag warning symptoms including the possibility of blood clots.  Certainly, given her metastatic cancer she has increased risk for clotting and if she does indeed have Covid this could potentially increase risk.  Patient is given very strict red flag warning symptoms with ER/follow-up precautions.    Patient be treated with Augmentin for sinusitis.  Encourage nasal saline rinse and over-the-counter Flonase nasal spray.    Patient is a diabetic and therefore would like to avoid liquid cough medications which would contain sugar.  Patient is given Tessalon Perles as needed for cough.    Differential diagnosis, natural history, supportive care, and indications for immediate follow-up discussed.     Red flag warning symptoms and strict ER/follow-up precautions given.  The patient demonstrated a good understanding and agreed with the treatment plan.    Upon entering exam room I ensured patient was wearing a mask.  This provider wore appropriate PPE throughout entire visit.  Patient wore mask entire visit except for a brief period while  examining oropharynx.    Please note that this note was created using voice recognition speech to text software. Every effort has been made to correct obvious errors.  However, I expect there are errors of grammar and possibly context that were not discovered prior to finalizing the note  RANDY Ag PA-C

## 2021-09-08 DIAGNOSIS — R05.9 COUGH: ICD-10-CM

## 2022-01-10 ENCOUNTER — APPOINTMENT (RX ONLY)
Dept: URBAN - METROPOLITAN AREA CLINIC 31 | Facility: CLINIC | Age: 62
Setting detail: DERMATOLOGY
End: 2022-01-10

## 2022-01-10 DIAGNOSIS — L81.4 OTHER MELANIN HYPERPIGMENTATION: ICD-10-CM

## 2022-01-10 DIAGNOSIS — D22 MELANOCYTIC NEVI: ICD-10-CM

## 2022-01-10 DIAGNOSIS — Z71.89 OTHER SPECIFIED COUNSELING: ICD-10-CM

## 2022-01-10 DIAGNOSIS — D18.0 HEMANGIOMA: ICD-10-CM

## 2022-01-10 DIAGNOSIS — L82.1 OTHER SEBORRHEIC KERATOSIS: ICD-10-CM

## 2022-01-10 PROBLEM — D22.71 MELANOCYTIC NEVI OF RIGHT LOWER LIMB, INCLUDING HIP: Status: ACTIVE | Noted: 2022-01-10

## 2022-01-10 PROBLEM — D22.62 MELANOCYTIC NEVI OF LEFT UPPER LIMB, INCLUDING SHOULDER: Status: ACTIVE | Noted: 2022-01-10

## 2022-01-10 PROBLEM — D22.5 MELANOCYTIC NEVI OF TRUNK: Status: ACTIVE | Noted: 2022-01-10

## 2022-01-10 PROBLEM — D22.72 MELANOCYTIC NEVI OF LEFT LOWER LIMB, INCLUDING HIP: Status: ACTIVE | Noted: 2022-01-10

## 2022-01-10 PROBLEM — D18.01 HEMANGIOMA OF SKIN AND SUBCUTANEOUS TISSUE: Status: ACTIVE | Noted: 2022-01-10

## 2022-01-10 PROBLEM — D22.61 MELANOCYTIC NEVI OF RIGHT UPPER LIMB, INCLUDING SHOULDER: Status: ACTIVE | Noted: 2022-01-10

## 2022-01-10 PROCEDURE — ? COUNSELING

## 2022-01-10 PROCEDURE — 99213 OFFICE O/P EST LOW 20 MIN: CPT

## 2022-01-10 ASSESSMENT — LOCATION SIMPLE DESCRIPTION DERM
LOCATION SIMPLE: RIGHT LOWER BACK
LOCATION SIMPLE: ABDOMEN
LOCATION SIMPLE: RIGHT FOREARM
LOCATION SIMPLE: UPPER BACK
LOCATION SIMPLE: LEFT HAND
LOCATION SIMPLE: LEFT CALF
LOCATION SIMPLE: LEFT THIGH
LOCATION SIMPLE: CHEST
LOCATION SIMPLE: RIGHT HAND
LOCATION SIMPLE: LEFT SHOULDER
LOCATION SIMPLE: ABDOMEN
LOCATION SIMPLE: LEFT FOREARM
LOCATION SIMPLE: RIGHT THIGH
LOCATION SIMPLE: LEFT CHEEK
LOCATION SIMPLE: LEFT UPPER ARM
LOCATION SIMPLE: RIGHT UPPER ARM
LOCATION SIMPLE: RIGHT CHEEK
LOCATION SIMPLE: RIGHT CALF
LOCATION SIMPLE: RIGHT SHOULDER

## 2022-01-10 ASSESSMENT — LOCATION DETAILED DESCRIPTION DERM
LOCATION DETAILED: LEFT PROXIMAL CALF
LOCATION DETAILED: RIGHT ANTERIOR DISTAL THIGH
LOCATION DETAILED: RIGHT VENTRAL DISTAL FOREARM
LOCATION DETAILED: LEFT RADIAL DORSAL HAND
LOCATION DETAILED: LEFT PROXIMAL DORSAL FOREARM
LOCATION DETAILED: RIGHT SUPERIOR MEDIAL MIDBACK
LOCATION DETAILED: PERIUMBILICAL SKIN
LOCATION DETAILED: RIGHT POSTERIOR SHOULDER
LOCATION DETAILED: PERIUMBILICAL SKIN
LOCATION DETAILED: LEFT ANTERIOR DISTAL THIGH
LOCATION DETAILED: RIGHT LATERAL SUPERIOR CHEST
LOCATION DETAILED: LEFT PROXIMAL POSTERIOR UPPER ARM
LOCATION DETAILED: RIGHT CENTRAL MALAR CHEEK
LOCATION DETAILED: EPIGASTRIC SKIN
LOCATION DETAILED: RIGHT PROXIMAL CALF
LOCATION DETAILED: LEFT LATERAL MALAR CHEEK
LOCATION DETAILED: INFERIOR THORACIC SPINE
LOCATION DETAILED: LEFT VENTRAL DISTAL FOREARM
LOCATION DETAILED: LEFT VENTRAL PROXIMAL FOREARM
LOCATION DETAILED: LEFT POSTERIOR SHOULDER
LOCATION DETAILED: RIGHT VENTRAL LATERAL PROXIMAL FOREARM
LOCATION DETAILED: RIGHT PROXIMAL DORSAL FOREARM
LOCATION DETAILED: RIGHT PROXIMAL POSTERIOR UPPER ARM
LOCATION DETAILED: RIGHT ULNAR DORSAL HAND

## 2022-01-10 ASSESSMENT — LOCATION ZONE DERM
LOCATION ZONE: HAND
LOCATION ZONE: FACE
LOCATION ZONE: LEG
LOCATION ZONE: TRUNK
LOCATION ZONE: ARM
LOCATION ZONE: TRUNK

## 2022-01-10 NOTE — HPI: FULL BODY SKIN EXAMINATION
How Severe Are Your Spot(S)?: mild
What Type Of Note Output Would You Prefer (Optional)?: Standard Output
What Is The Reason For Today's Visit?: Annual Full Body Skin Examination with No Concerns
What Is The Reason For Today's Visit? (Being Monitored For X): concerning skin lesions on an annual basis
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

## 2022-01-23 ENCOUNTER — OFFICE VISIT (OUTPATIENT)
Dept: URGENT CARE | Facility: CLINIC | Age: 62
End: 2022-01-23
Payer: COMMERCIAL

## 2022-01-23 ENCOUNTER — HOSPITAL ENCOUNTER (OUTPATIENT)
Facility: MEDICAL CENTER | Age: 62
End: 2022-01-23
Attending: PHYSICIAN ASSISTANT
Payer: COMMERCIAL

## 2022-01-23 VITALS
SYSTOLIC BLOOD PRESSURE: 114 MMHG | OXYGEN SATURATION: 98 % | RESPIRATION RATE: 16 BRPM | TEMPERATURE: 97.5 F | DIASTOLIC BLOOD PRESSURE: 62 MMHG | HEART RATE: 87 BPM | HEIGHT: 68 IN | BODY MASS INDEX: 40.39 KG/M2 | WEIGHT: 266.5 LBS

## 2022-01-23 DIAGNOSIS — J01.00 ACUTE MAXILLARY SINUSITIS, RECURRENCE NOT SPECIFIED: ICD-10-CM

## 2022-01-23 DIAGNOSIS — R07.89 RIGHT-SIDED CHEST WALL PAIN: ICD-10-CM

## 2022-01-23 PROCEDURE — U0005 INFEC AGEN DETEC AMPLI PROBE: HCPCS

## 2022-01-23 PROCEDURE — U0003 INFECTIOUS AGENT DETECTION BY NUCLEIC ACID (DNA OR RNA); SEVERE ACUTE RESPIRATORY SYNDROME CORONAVIRUS 2 (SARS-COV-2) (CORONAVIRUS DISEASE [COVID-19]), AMPLIFIED PROBE TECHNIQUE, MAKING USE OF HIGH THROUGHPUT TECHNOLOGIES AS DESCRIBED BY CMS-2020-01-R: HCPCS

## 2022-01-23 PROCEDURE — 99214 OFFICE O/P EST MOD 30 MIN: CPT | Performed by: PHYSICIAN ASSISTANT

## 2022-01-23 RX ORDER — AMOXICILLIN AND CLAVULANATE POTASSIUM 875; 125 MG/1; MG/1
1 TABLET, FILM COATED ORAL 2 TIMES DAILY
Qty: 14 TABLET | Refills: 0 | Status: SHIPPED | OUTPATIENT
Start: 2022-01-23 | End: 2022-01-30

## 2022-01-23 RX ORDER — AMOXICILLIN AND CLAVULANATE POTASSIUM 875; 125 MG/1; MG/1
1 TABLET, FILM COATED ORAL 2 TIMES DAILY
Qty: 14 TABLET | Refills: 0 | Status: SHIPPED | OUTPATIENT
Start: 2022-01-23 | End: 2022-01-23 | Stop reason: SDUPTHER

## 2022-01-23 ASSESSMENT — ENCOUNTER SYMPTOMS
DIARRHEA: 0
SORE THROAT: 0
WHEEZING: 0
CHILLS: 0
SHORTNESS OF BREATH: 0
VOMITING: 0
MYALGIAS: 0
ABDOMINAL PAIN: 0
SWOLLEN GLANDS: 0
SINUS PAIN: 1
SINUS PRESSURE: 1
FEVER: 0
HEADACHES: 1
HEMOPTYSIS: 0
COUGH: 1
NAUSEA: 0
SPUTUM PRODUCTION: 1

## 2022-01-23 ASSESSMENT — FIBROSIS 4 INDEX: FIB4 SCORE: 1.49

## 2022-01-23 NOTE — PROGRESS NOTES
Subjective     Sydney Carlin is a 61 y.o. female who presents with Coronavirus Screening (exposed, sinus problems, runny nose x week ago) and Chest Pain (x 3 weeks; had iv placed on (R) arm 12/31 and since then pain in chest )            Sinus Problem  This is a new problem. The current episode started 1 to 4 weeks ago. The problem has been gradually worsening since onset. There has been no fever. The pain is moderate. Associated symptoms include congestion (with purulent green mucous ), coughing, headaches and sinus pressure. Pertinent negatives include no chills, ear pain, shortness of breath, sore throat or swollen glands.     The patient reports she was exposed to COVID over 1 week ago. She went to a restaurant with her kids and they all came back positive for COVID. She tested at home 3 days ago and it came back negative. She is vaccinated.     Additionally the patient reports having intermittent chest pain over the past 3 weeks on the right side of chest around her port. The patient is currently on cancer treatment for brain tumors. She was at [facility] 3 weeks ago and had an IV in her right arm. She states she suffered from bruising and swelling for several weeks after the IV. She denies any pain or swelling today. She reports having the intermittent chest pain since then. The pain is with coughing and certain movement. She denies pain at rest. No active pain at this time. She reports having an EKG 3 weeks ago that came back normal. She denies any shortness of breath or difficulty breathing. She has no abnormal calf pain or swelling.      Past Medical History:   Diagnosis Date   • Allergy     grapes, surya   • Arrhythmia     SVT   • Arthritis    • Back pain    • Birth control    • Bronchitis 2019   • Cancer (HCC) 07/2016    Fallopian Tube   • Delayed emergence from general anesthesia    • Diabetes (HCC)    • Fallopian tube cancer, carcinoma (HCC) 2016    Status post HONG-BSO, cervical polypectomy and  "omentectomy at [facility] [place], followed by experimental chemotherapy/immunoglobulin treatment.   • Fatty liver    • H/O: pneumonia    • Heart burn    • Hiatus hernia syndrome    • Hyperplastic colon polyp    • Lactose intolerance    • Liver fatty degeneration    • Obesity    • Osteoarthritis    • Pneumonia    • PONV (postoperative nausea and vomiting)    • Seasonal allergic rhinitis 4/24/2017   • Sleep apnea 11/20/2012    CPAP   • Snoring    • SVT (supraventricular tachycardia) (HCC)    • Thyroid nodule 8/15/2013   • Venous insufficiency 2/2019 in setting of LE edema left>right            Review of Systems   Constitutional: Positive for malaise/fatigue. Negative for chills and fever.   HENT: Positive for congestion (with purulent green mucous ), sinus pressure and sinus pain. Negative for ear pain and sore throat.    Respiratory: Positive for cough and sputum production. Negative for hemoptysis, shortness of breath and wheezing.    Cardiovascular: Positive for chest pain (intermittent- no active pain ). Negative for leg swelling.   Gastrointestinal: Negative for abdominal pain, diarrhea, nausea and vomiting.   Musculoskeletal: Negative for myalgias.   Skin: Negative for rash.   Neurological: Positive for headaches.     All other systems reviewed and are negative.            Objective     /62 (BP Location: Left arm, Patient Position: Sitting)   Pulse 87   Temp 36.4 °C (97.5 °F) (Temporal)   Resp 16   Ht 1.727 m (5' 8\")   Wt 121 kg (266 lb 8 oz)   SpO2 98%   BMI 40.52 kg/m²      Physical Exam  Constitutional:       General: She is not in acute distress.     Appearance: She is not ill-appearing.   HENT:      Head: Normocephalic and atraumatic.      Nose: Mucosal edema, congestion and rhinorrhea present.      Right Sinus: Maxillary sinus tenderness present.      Left Sinus: Maxillary sinus tenderness present.      Mouth/Throat:      Mouth: Mucous membranes are moist.      Pharynx: No posterior " oropharyngeal erythema.   Eyes:      Conjunctiva/sclera: Conjunctivae normal.   Cardiovascular:      Rate and Rhythm: Normal rate and regular rhythm.      Heart sounds: Normal heart sounds.   Pulmonary:      Effort: Pulmonary effort is normal. No respiratory distress.      Breath sounds: Normal breath sounds. No wheezing, rhonchi or rales.   Chest:      Chest wall: Tenderness (local TTP in right chest wall- pain reproduced by movemetn. No overlying edema or erythema over port.) present.       Musculoskeletal:      Comments: Right arm without any edema, ecchymosis or erythema. NTTP.  Lower extremities without calf TTP or edema bilaterally.   Lymphadenopathy:      Cervical: No cervical adenopathy.   Skin:     General: Skin is warm and dry.   Neurological:      General: No focal deficit present.      Mental Status: She is alert and oriented to person, place, and time.   Psychiatric:         Mood and Affect: Mood normal.         Behavior: Behavior normal.         Thought Content: Thought content normal.         Judgment: Judgment normal.                             Assessment & Plan        1. Acute maxillary sinusitis, recurrence not specified    - SARS-CoV-2 PCR (24 hour In-House): Collect NP swab in [facility]; Future  Pending   Isolation guidelines, conservative measures and ER precautions discussed.   - amoxicillin-clavulanate (AUGMENTIN) 875-125 MG Tab; Take 1 Tablet by mouth 2 times a day for 7 days.  Dispense: 14 Tablet; Refill: 0    2. Right-sided chest wall pain    Patient declined EKG or D-Dimer tests offered. She does not demonstrate classic signs of PE or DVT but she is under cancer treatment and is at risk. I do feel her chest pain is musculoskeletal in origin as it is very localized and only present with palpation and certain movements.   The patient has follow-up with [facility] [place] [facility] next week.    ER precautions discussed. ED if any shortness of breath, persistent chest pain, lower extremity pain or  worsening symptoms. She was informed we do not have the ability to rule out cardiac or pulmonary causes for chest pain in the UC setting.          Differential diagnoses, Supportive care, and indications for immediate follow-up discussed with patient.   Pathogenesis of diagnosis discussed including typical length and natural progression.   Instructed to return to clinic or nearest emergency department for any change in condition, further concerns, or worsening of symptoms.    The patient demonstrated a good understanding and agreed with the treatment plan.    Kera Peter P.A.-C.

## 2022-02-01 ENCOUNTER — OFFICE VISIT (OUTPATIENT)
Dept: URGENT CARE | Facility: CLINIC | Age: 62
End: 2022-02-01
Payer: COMMERCIAL

## 2022-02-01 VITALS
BODY MASS INDEX: 40.62 KG/M2 | SYSTOLIC BLOOD PRESSURE: 140 MMHG | WEIGHT: 268 LBS | HEIGHT: 68 IN | HEART RATE: 54 BPM | OXYGEN SATURATION: 99 % | DIASTOLIC BLOOD PRESSURE: 70 MMHG | TEMPERATURE: 97.4 F

## 2022-02-01 DIAGNOSIS — H10.9 CONJUNCTIVITIS OF BOTH EYES, UNSPECIFIED CONJUNCTIVITIS TYPE: ICD-10-CM

## 2022-02-01 PROCEDURE — 99213 OFFICE O/P EST LOW 20 MIN: CPT | Performed by: PHYSICIAN ASSISTANT

## 2022-02-01 RX ORDER — POLYMYXIN B SULFATE AND TRIMETHOPRIM 1; 10000 MG/ML; [USP'U]/ML
1 SOLUTION OPHTHALMIC EVERY 4 HOURS
Qty: 10 ML | Refills: 0 | Status: SHIPPED | OUTPATIENT
Start: 2022-02-01 | End: 2022-02-08

## 2022-02-01 ASSESSMENT — ENCOUNTER SYMPTOMS
PHOTOPHOBIA: 0
DOUBLE VISION: 0
HEADACHES: 0
SINUS PAIN: 0
SORE THROAT: 0
EYE DISCHARGE: 1
BLURRED VISION: 0
VOMITING: 0
FEVER: 0
COUGH: 0
CHILLS: 0
MYALGIAS: 0
EYE PAIN: 0
DIZZINESS: 0
EYE REDNESS: 1
NAUSEA: 0

## 2022-02-01 ASSESSMENT — FIBROSIS 4 INDEX: FIB4 SCORE: 1.49

## 2022-02-01 NOTE — PROGRESS NOTES
Subjective:   Sydney Carlin is a 61 y.o. female who presents for Conjunctivitis (both possible pink eye started yesterday)      HPI:  This is a very pleasant 61-year-old female presenting to the clinic for evaluation of potential conjunctivitis.  Patient states she has been playing with her dogs lately who have had increased discharge from the eyes.  She woke up over the past 2 mornings and had redness and mild crusting to the eyes.  Also has a slight burning sensation to the eyes.  No visual change.  No periorbital swelling or edema.  No pain with eye movements.  No preceding URI.  She is concerned for possible bacterial conjunctivitis and would like to begin treatment if necessary.  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    Review of Systems   Constitutional: Negative for chills, fever and malaise/fatigue.   HENT: Negative for congestion, sinus pain and sore throat.    Eyes: Positive for discharge and redness. Negative for blurred vision, double vision, photophobia and pain.   Respiratory: Negative for cough.    Gastrointestinal: Negative for nausea and vomiting.   Musculoskeletal: Negative for myalgias.   Skin: Negative for rash.   Neurological: Negative for dizziness and headaches.       Medications:    • acetaminophen Tabs  • B-12 PO  • B-D UF III MINI PEN NEEDLES Misc  • dexamethasone Tabs  • FOLIC ACID PO  • JUICE PLUS FIBRE PO  • MAGNESIUM PO  • metFORMIN Tabs  • Olaparib Tabs  • OneTouch Delica Lancets 33G Misc  • OneTouch Delica Plus [id number] Misc  • OneTouch Ultra Strp  • OneTouch Verio Strp  • polymixin-trimethoprim Soln  • REFRESH DRY EYE THERAPY OP  • Lizzeth Muniz Sopn  • Vitamin D3 Caps    Allergies: Aspirin, Buffy, Oseltamivir, Sulfa drugs, Tamiflu, Ibuprofen, Oseltamivir phosphate, Skin adhesives [mecrylate], and Temafloxacin    Problem List: Sydney Carlin  "does not have any pertinent problems on file.    Surgical History:  Past Surgical History:   Procedure Laterality Date   • PB TOTAL HIP ARTHROPLASTY Left 6/15/2021    Procedure: ARTHROPLASTY, HIP, TOTAL, ANTERIOR APPROACH.;  Surgeon: Steve Dotson M.D.;  Location: SURGERY [facility];  Service: Orthopedics   • OTHER Right 07/2020    cerebellum cystic mass excision   • OTHER ABDOMINAL SURGERY  05/2019    Ventral Hernia Repair   • ABDOMINAL HYSTERECTOMY TOTAL  2016   • ANKLE LIGAMENT RECONSTRUCTION  08/2001    right ankle surgery   • LUMPECTOMY  1995    benign   • LUMPECTOMY      benign   • OTHER Right     Chest Power Port   • TONSILLECTOMY  6 years old       Past Social Hx: Sydney Carlin  reports that she has never smoked. She has never used smokeless tobacco. She reports current alcohol use of about 0.5 oz of alcohol per week. She reports current drug use. Drug: Inhaled.     Past Family Hx:  Sydney Carlin family history includes Arthritis in her father; Cancer in her father; Diabetes in her child; Hypertension in her mother.     Problem list, medications, and allergies reviewed by myself today in Epic.     Objective:     /70 (BP Location: Right arm, Patient Position: Sitting)   Pulse (!) 54   Temp 36.3 °C (97.4 °F) (Temporal)   Ht 1.727 m (5' 8\")   Wt 122 kg (268 lb)   SpO2 99%   BMI 40.75 kg/m²     Physical Exam  Constitutional:       General: She is not in acute distress.     Appearance: Normal appearance. She is not ill-appearing, toxic-appearing or diaphoretic.   HENT:      Head: Normocephalic and atraumatic.      Right Ear: Tympanic membrane, ear canal and external ear normal.      Left Ear: Tympanic membrane, ear canal and external ear normal.      Nose: Nose normal. No congestion or rhinorrhea.      Mouth/Throat:      Mouth: Mucous membranes are moist.      Pharynx: No oropharyngeal exudate or posterior oropharyngeal erythema.   Eyes:      Extraocular Movements: Extraocular movements " intact.      Conjunctiva/sclera: Conjunctivae normal.      Pupils: Pupils are equal, round, and reactive to light.      Comments: Bilateral conjunctiva not injected.  EOMs are full intact and pain-free.  PERRLA.  There is no periorbital swelling or erythema.  No discharge or drainage in the eyes.  No crusting present.   Cardiovascular:      Rate and Rhythm: Normal rate and regular rhythm.      Pulses: Normal pulses.      Heart sounds: Normal heart sounds.   Pulmonary:      Effort: Pulmonary effort is normal.      Breath sounds: Normal breath sounds. No wheezing.   Musculoskeletal:      Cervical back: Normal range of motion. No muscular tenderness.   Lymphadenopathy:      Cervical: No cervical adenopathy.   Skin:     General: Skin is warm and dry.      Capillary Refill: Capillary refill takes less than 2 seconds.   Neurological:      Mental Status: She is alert.   Psychiatric:         Mood and Affect: Mood normal.         Thought Content: Thought content normal.           Assessment/Plan:     Comments/MDM:     • On exam conjunctiva noninjected.  No periorbital swelling or redness.  No discharge or drainage.  Visual acuity intact.  EOMs full intact and pain-free.  I believe likely irritant versus allergic component at this time.  Recently started using new mascara approximately 2 weeks ago.  Also potential allergic reaction from her dogs.  Denies any pruritus at this time.  OTC Pataday recommended at this time.  Discontinue current mascara.  Contingent prescription for Polytrim provided shall symptoms worsen or fail to improve.  Strict return precautions for any pain, visual change or periorbital swelling.     Diagnosis and associated orders:     1. Conjunctivitis of both eyes, unspecified conjunctivitis type  polymixin-trimethoprim (POLYTRIM) 41042-1.1 UNIT/ML-% Solution              Differential diagnosis, natural history, supportive care, and indications for immediate follow-up discussed.    I personally reviewed  prior external notes and test results pertinent to today's visit.     Advised the patient to follow-up with the primary care physician for recheck, reevaluation, and consideration of further management.    Please note that this dictation was created using voice recognition software. I have made reasonable attempt to correct obvious errors, but I expect that there are errors of grammar and possibly content that I did not discover before finalizing the note.    This note was electronically signed by CECILIA Niño PA-C

## 2022-07-12 ENCOUNTER — APPOINTMENT (RX ONLY)
Dept: URBAN - METROPOLITAN AREA CLINIC 31 | Facility: CLINIC | Age: 62
Setting detail: DERMATOLOGY
End: 2022-07-12

## 2022-07-12 DIAGNOSIS — Z71.89 OTHER SPECIFIED COUNSELING: ICD-10-CM

## 2022-07-12 DIAGNOSIS — D22 MELANOCYTIC NEVI: ICD-10-CM

## 2022-07-12 DIAGNOSIS — L82.1 OTHER SEBORRHEIC KERATOSIS: ICD-10-CM

## 2022-07-12 DIAGNOSIS — L81.4 OTHER MELANIN HYPERPIGMENTATION: ICD-10-CM

## 2022-07-12 DIAGNOSIS — D18.0 HEMANGIOMA: ICD-10-CM

## 2022-07-12 DIAGNOSIS — L44.8 OTHER SPECIFIED PAPULOSQUAMOUS DISORDERS: ICD-10-CM

## 2022-07-12 PROBLEM — D22.71 MELANOCYTIC NEVI OF RIGHT LOWER LIMB, INCLUDING HIP: Status: ACTIVE | Noted: 2022-07-12

## 2022-07-12 PROBLEM — D22.72 MELANOCYTIC NEVI OF LEFT LOWER LIMB, INCLUDING HIP: Status: ACTIVE | Noted: 2022-07-12

## 2022-07-12 PROBLEM — D18.01 HEMANGIOMA OF SKIN AND SUBCUTANEOUS TISSUE: Status: ACTIVE | Noted: 2022-07-12

## 2022-07-12 PROBLEM — D23.122 OTHER BENIGN NEOPLASM OF SKIN OF LEFT LOWER EYELID, INCLUDING CANTHUS: Status: ACTIVE | Noted: 2022-07-12

## 2022-07-12 PROBLEM — D22.61 MELANOCYTIC NEVI OF RIGHT UPPER LIMB, INCLUDING SHOULDER: Status: ACTIVE | Noted: 2022-07-12

## 2022-07-12 PROBLEM — D22.62 MELANOCYTIC NEVI OF LEFT UPPER LIMB, INCLUDING SHOULDER: Status: ACTIVE | Noted: 2022-07-12

## 2022-07-12 PROBLEM — D22.5 MELANOCYTIC NEVI OF TRUNK: Status: ACTIVE | Noted: 2022-07-12

## 2022-07-12 PROCEDURE — ? COUNSELING

## 2022-07-12 PROCEDURE — 99213 OFFICE O/P EST LOW 20 MIN: CPT

## 2022-07-12 ASSESSMENT — LOCATION ZONE DERM
LOCATION ZONE: ARM
LOCATION ZONE: HAND
LOCATION ZONE: LEG
LOCATION ZONE: FACE
LOCATION ZONE: TRUNK

## 2022-07-12 ASSESSMENT — LOCATION SIMPLE DESCRIPTION DERM
LOCATION SIMPLE: UPPER BACK
LOCATION SIMPLE: RIGHT UPPER BACK
LOCATION SIMPLE: LEFT HAND
LOCATION SIMPLE: RIGHT UPPER ARM
LOCATION SIMPLE: RIGHT CHEEK
LOCATION SIMPLE: LEFT UPPER ARM
LOCATION SIMPLE: RIGHT CALF
LOCATION SIMPLE: ABDOMEN
LOCATION SIMPLE: LEFT CHEEK
LOCATION SIMPLE: LEFT THIGH
LOCATION SIMPLE: LEFT SHOULDER
LOCATION SIMPLE: LEFT BREAST
LOCATION SIMPLE: RIGHT SHOULDER
LOCATION SIMPLE: RIGHT HAND
LOCATION SIMPLE: RIGHT THIGH
LOCATION SIMPLE: RIGHT LOWER BACK
LOCATION SIMPLE: LEFT FOREARM
LOCATION SIMPLE: LEFT CALF
LOCATION SIMPLE: RIGHT FOREARM

## 2022-07-12 ASSESSMENT — LOCATION DETAILED DESCRIPTION DERM
LOCATION DETAILED: RIGHT CENTRAL MALAR CHEEK
LOCATION DETAILED: LEFT PROXIMAL CALF
LOCATION DETAILED: LEFT PROXIMAL DORSAL FOREARM
LOCATION DETAILED: LEFT POSTERIOR SHOULDER
LOCATION DETAILED: RIGHT ANTERIOR DISTAL THIGH
LOCATION DETAILED: RIGHT ANTERIOR DISTAL UPPER ARM
LOCATION DETAILED: RIGHT PROXIMAL DORSAL FOREARM
LOCATION DETAILED: EPIGASTRIC SKIN
LOCATION DETAILED: RIGHT VENTRAL DISTAL FOREARM
LOCATION DETAILED: LEFT PROXIMAL POSTERIOR UPPER ARM
LOCATION DETAILED: RIGHT POSTERIOR SHOULDER
LOCATION DETAILED: LEFT ANTERIOR DISTAL UPPER ARM
LOCATION DETAILED: INFERIOR THORACIC SPINE
LOCATION DETAILED: RIGHT ULNAR DORSAL HAND
LOCATION DETAILED: RIGHT PROXIMAL POSTERIOR UPPER ARM
LOCATION DETAILED: LEFT RADIAL DORSAL HAND
LOCATION DETAILED: RIGHT INFERIOR MEDIAL UPPER BACK
LOCATION DETAILED: RIGHT SUPERIOR MEDIAL MIDBACK
LOCATION DETAILED: LEFT MEDIAL BREAST 10-11:00 REGION
LOCATION DETAILED: LEFT LATERAL MALAR CHEEK
LOCATION DETAILED: LEFT ANTERIOR DISTAL THIGH
LOCATION DETAILED: RIGHT PROXIMAL CALF
LOCATION DETAILED: PERIUMBILICAL SKIN
LOCATION DETAILED: LEFT VENTRAL DISTAL FOREARM

## (undated) DEVICE — BLADE SURGICAL CLIPPER - (50EA/CA)

## (undated) DEVICE — DRAPE IOBAN LARGE 2 INCISE FILM (5EA/CA)

## (undated) DEVICE — Device

## (undated) DEVICE — ELECTRODE 850 FOAM ADHESIVE - HYDROGEL RADIOTRNSPRNT (50/PK)

## (undated) DEVICE — MASK ANESTHESIA ADULT  - (100/CA)

## (undated) DEVICE — DRAPE SURGICAL U 77X120 - (10/CA)

## (undated) DEVICE — SET EXTENSION WITH 2 PORTS (48EA/CA) ***PART #2C8610 IS A SUBSTITUTE*****

## (undated) DEVICE — SUTURE 2-0 MONOCRYL PLUS UNDYED CT-1 1 X 36 (36EA/BX)"

## (undated) DEVICE — KIT ANESTHESIA W/CIRCUIT & 3/LT BAG W/FILTER (20EA/CA)

## (undated) DEVICE — BLADE RECIP 77.5 X 11.2 X .76MM (1/EA)

## (undated) DEVICE — CANISTER SUCTION 3000ML MECHANICAL FILTER AUTO SHUTOFF MEDI-VAC NONSTERILE LF DISP  (40EA/CA)

## (undated) DEVICE — TOWEL STOP TIMEOUT SAFETY FLAG (40EA/CA)

## (undated) DEVICE — HANDPIECE 10FT INTPLS SCT PLS IRRIGATION HAND CONTROL SET (6/PK)

## (undated) DEVICE — GLOVE BIOGEL PI INDICATOR SZ 8.0 SURGICAL PF LF -(50/BX 4BX/CA)

## (undated) DEVICE — GLOVE BIOGEL PI ORTHO SZ 7.5 PF LF (40PR/BX)

## (undated) DEVICE — WATER IRRIGATION STERILE 1000ML (12EA/CA)

## (undated) DEVICE — DISPOSABLE WOUND VAC PICO 10 X 30 CM - WOUND CARE (3/CA)

## (undated) DEVICE — SUCTION INSTRUMENT YANKAUER BULBOUS TIP W/O VENT (50EA/CA)

## (undated) DEVICE — DRAPE C-ARM LARGE 41IN X 74 IN - (10/BX 2BX/CA)

## (undated) DEVICE — DRAPESURG STERI-DRAPE LONG - (10/BX 4BX/CA)

## (undated) DEVICE — PACK TOTAL HIP - (1/CA)

## (undated) DEVICE — SUTURE 3-0 MONOCRYL PLUS PS-1 - 27 INCH (36/BX)

## (undated) DEVICE — SODIUM CHL IRRIGATION 0.9% 1000ML (12EA/CA)

## (undated) DEVICE — DRESSING AQUACEL AG ADVANTAGE 3.5 X 10" (10EA/BX)"

## (undated) DEVICE — NEPTUNE 4 PORT MANIFOLD - (20/PK)

## (undated) DEVICE — SUTURE GENERAL

## (undated) DEVICE — IMPLANT FLEXIBLE DRILL 25MM (1EA)

## (undated) DEVICE — LACTATED RINGERS INJ 1000 ML - (14EA/CA 60CA/PF)

## (undated) DEVICE — SODIUM CHL. IRRIGATION 0.9% 3000ML (4EA/CA 65CA/PF)

## (undated) DEVICE — SUTURE 5 ETHIBOND V-37 (12PK/BX)

## (undated) DEVICE — GLOVE BIOGEL PI ORTHO SZ 8 PF LF (40PR/BX)

## (undated) DEVICE — LENS/HOOD FOR SPACESUIT - (32/PK) PEEL AWAY FACE

## (undated) DEVICE — TIP INTPLS HFLO ML ORFC BTRY - (12/CS)  FOR SURGILAV

## (undated) DEVICE — TUBING CLEARLINK DUO-VENT - C-FLO (48EA/CA)

## (undated) DEVICE — GLOVE BIOGEL ECLIPSE PF LATEX SIZE 8.0  (50PR/BX)

## (undated) DEVICE — HEAD HOLDER JUNIOR/ADULT

## (undated) DEVICE — ELECTRODE DUAL RETURN W/ CORD - (50/PK)

## (undated) DEVICE — CHLORAPREP 26 ML APPLICATOR - ORANGE TINT(25/CA)

## (undated) DEVICE — GOWN WARMING X-LARGE FLEX - (20/CA)

## (undated) DEVICE — GLOVE BIOGEL SZ 8 SURGICAL PF LTX - (50PR/BX 4BX/CA)